# Patient Record
Sex: MALE | Race: OTHER | HISPANIC OR LATINO | Employment: OTHER | URBAN - METROPOLITAN AREA
[De-identification: names, ages, dates, MRNs, and addresses within clinical notes are randomized per-mention and may not be internally consistent; named-entity substitution may affect disease eponyms.]

---

## 2020-07-18 ENCOUNTER — HOSPITAL ENCOUNTER (INPATIENT)
Facility: HOSPITAL | Age: 75
LOS: 1 days | Discharge: HOME | DRG: 384 | End: 2020-07-20
Attending: EMERGENCY MEDICINE | Admitting: FAMILY MEDICINE
Payer: MEDICARE

## 2020-07-18 ENCOUNTER — APPOINTMENT (EMERGENCY)
Dept: RADIOLOGY | Facility: HOSPITAL | Age: 75
DRG: 384 | End: 2020-07-18
Payer: MEDICARE

## 2020-07-18 DIAGNOSIS — R10.13 DYSPEPSIA: ICD-10-CM

## 2020-07-18 DIAGNOSIS — Z87.11 HISTORY OF PEPTIC ULCER: ICD-10-CM

## 2020-07-18 DIAGNOSIS — D64.9 ANEMIA: ICD-10-CM

## 2020-07-18 DIAGNOSIS — R06.01 ORTHOPNEA: ICD-10-CM

## 2020-07-18 DIAGNOSIS — I10 ESSENTIAL HYPERTENSION: ICD-10-CM

## 2020-07-18 DIAGNOSIS — R07.9 CHEST PAIN: ICD-10-CM

## 2020-07-18 DIAGNOSIS — K27.9 PEPTIC ULCER DISEASE: ICD-10-CM

## 2020-07-18 DIAGNOSIS — R07.89 ATYPICAL CHEST PAIN: Primary | ICD-10-CM

## 2020-07-18 PROBLEM — R06.02 CHRONIC SHORTNESS OF BREATH: Status: ACTIVE | Noted: 2020-07-18

## 2020-07-18 LAB
ALBUMIN SERPL BCP-MCNC: 3.3 G/DL (ref 3.5–5)
ALP SERPL-CCNC: 115 U/L (ref 46–116)
ALT SERPL W P-5'-P-CCNC: 43 U/L (ref 12–78)
ANION GAP SERPL CALCULATED.3IONS-SCNC: 8 MMOL/L (ref 4–13)
AST SERPL W P-5'-P-CCNC: 31 U/L (ref 5–45)
BASOPHILS # BLD AUTO: 0.06 THOUSANDS/ΜL (ref 0–0.1)
BASOPHILS NFR BLD AUTO: 1 % (ref 0–1)
BILIRUB SERPL-MCNC: 0.5 MG/DL (ref 0.2–1)
BUN SERPL-MCNC: 7 MG/DL (ref 5–25)
CALCIUM SERPL-MCNC: 8.7 MG/DL (ref 8.3–10.1)
CHLORIDE SERPL-SCNC: 103 MMOL/L (ref 100–108)
CO2 SERPL-SCNC: 28 MMOL/L (ref 21–32)
CREAT SERPL-MCNC: 0.81 MG/DL (ref 0.6–1.3)
EOSINOPHIL # BLD AUTO: 0.1 THOUSAND/ΜL (ref 0–0.61)
EOSINOPHIL NFR BLD AUTO: 2 % (ref 0–6)
ERYTHROCYTE [DISTWIDTH] IN BLOOD BY AUTOMATED COUNT: 17.4 % (ref 11.6–15.1)
GFR SERPL CREATININE-BSD FRML MDRD: 86 ML/MIN/1.73SQ M
GLUCOSE SERPL-MCNC: 121 MG/DL (ref 65–140)
HCT VFR BLD AUTO: 30.2 % (ref 36.5–49.3)
HGB BLD-MCNC: 8.2 G/DL (ref 12–17)
IMM GRANULOCYTES # BLD AUTO: 0.01 THOUSAND/UL (ref 0–0.2)
IMM GRANULOCYTES NFR BLD AUTO: 0 % (ref 0–2)
LIPASE SERPL-CCNC: 206 U/L (ref 73–393)
LYMPHOCYTES # BLD AUTO: 2.45 THOUSANDS/ΜL (ref 0.6–4.47)
LYMPHOCYTES NFR BLD AUTO: 48 % (ref 14–44)
MCH RBC QN AUTO: 19.5 PG (ref 26.8–34.3)
MCHC RBC AUTO-ENTMCNC: 27.2 G/DL (ref 31.4–37.4)
MCV RBC AUTO: 72 FL (ref 82–98)
MONOCYTES # BLD AUTO: 0.84 THOUSAND/ΜL (ref 0.17–1.22)
MONOCYTES NFR BLD AUTO: 16 % (ref 4–12)
NEUTROPHILS # BLD AUTO: 1.71 THOUSANDS/ΜL (ref 1.85–7.62)
NEUTS SEG NFR BLD AUTO: 33 % (ref 43–75)
NRBC BLD AUTO-RTO: 0 /100 WBCS
PLATELET # BLD AUTO: 187 THOUSANDS/UL (ref 149–390)
PMV BLD AUTO: 9.5 FL (ref 8.9–12.7)
POTASSIUM SERPL-SCNC: 3.8 MMOL/L (ref 3.5–5.3)
PROT SERPL-MCNC: 7.8 G/DL (ref 6.4–8.2)
RBC # BLD AUTO: 4.2 MILLION/UL (ref 3.88–5.62)
SODIUM SERPL-SCNC: 139 MMOL/L (ref 136–145)
TROPONIN I SERPL-MCNC: 0.02 NG/ML
TROPONIN I SERPL-MCNC: 0.03 NG/ML
TROPONIN I SERPL-MCNC: 0.03 NG/ML
TROPONIN I SERPL-MCNC: 0.04 NG/ML
TROPONIN I SERPL-MCNC: 0.04 NG/ML
WBC # BLD AUTO: 5.17 THOUSAND/UL (ref 4.31–10.16)

## 2020-07-18 PROCEDURE — 99285 EMERGENCY DEPT VISIT HI MDM: CPT | Performed by: PHYSICIAN ASSISTANT

## 2020-07-18 PROCEDURE — 99285 EMERGENCY DEPT VISIT HI MDM: CPT

## 2020-07-18 PROCEDURE — 36415 COLL VENOUS BLD VENIPUNCTURE: CPT | Performed by: PHYSICIAN ASSISTANT

## 2020-07-18 PROCEDURE — 84484 ASSAY OF TROPONIN QUANT: CPT | Performed by: FAMILY MEDICINE

## 2020-07-18 PROCEDURE — 96374 THER/PROPH/DIAG INJ IV PUSH: CPT

## 2020-07-18 PROCEDURE — 71045 X-RAY EXAM CHEST 1 VIEW: CPT

## 2020-07-18 PROCEDURE — 1124F ACP DISCUSS-NO DSCNMKR DOCD: CPT | Performed by: PATHOLOGY

## 2020-07-18 PROCEDURE — 99220 PR INITIAL OBSERVATION CARE/DAY 70 MINUTES: CPT | Performed by: INTERNAL MEDICINE

## 2020-07-18 PROCEDURE — 85025 COMPLETE CBC W/AUTO DIFF WBC: CPT | Performed by: PHYSICIAN ASSISTANT

## 2020-07-18 PROCEDURE — 96375 TX/PRO/DX INJ NEW DRUG ADDON: CPT

## 2020-07-18 PROCEDURE — 80053 COMPREHEN METABOLIC PANEL: CPT | Performed by: PHYSICIAN ASSISTANT

## 2020-07-18 PROCEDURE — 83690 ASSAY OF LIPASE: CPT | Performed by: PHYSICIAN ASSISTANT

## 2020-07-18 PROCEDURE — 96361 HYDRATE IV INFUSION ADD-ON: CPT

## 2020-07-18 PROCEDURE — 93005 ELECTROCARDIOGRAM TRACING: CPT

## 2020-07-18 PROCEDURE — 84484 ASSAY OF TROPONIN QUANT: CPT | Performed by: PHYSICIAN ASSISTANT

## 2020-07-18 RX ORDER — METOPROLOL TARTRATE 5 MG/5ML
5 INJECTION INTRAVENOUS ONCE
Status: COMPLETED | OUTPATIENT
Start: 2020-07-18 | End: 2020-07-18

## 2020-07-18 RX ORDER — KETOROLAC TROMETHAMINE 30 MG/ML
15 INJECTION, SOLUTION INTRAMUSCULAR; INTRAVENOUS ONCE
Status: COMPLETED | OUTPATIENT
Start: 2020-07-18 | End: 2020-07-18

## 2020-07-18 RX ORDER — MAGNESIUM HYDROXIDE/ALUMINUM HYDROXICE/SIMETHICONE 120; 1200; 1200 MG/30ML; MG/30ML; MG/30ML
30 SUSPENSION ORAL EVERY 4 HOURS PRN
Status: DISCONTINUED | OUTPATIENT
Start: 2020-07-18 | End: 2020-07-21 | Stop reason: HOSPADM

## 2020-07-18 RX ORDER — HYDROCHLOROTHIAZIDE 12.5 MG/1
12.5 TABLET ORAL DAILY
COMMUNITY
End: 2020-07-20 | Stop reason: HOSPADM

## 2020-07-18 RX ORDER — LIDOCAINE HYDROCHLORIDE 20 MG/ML
15 SOLUTION OROPHARYNGEAL ONCE
Status: COMPLETED | OUTPATIENT
Start: 2020-07-18 | End: 2020-07-18

## 2020-07-18 RX ORDER — SUCRALFATE ORAL 1 G/10ML
1000 SUSPENSION ORAL ONCE
Status: COMPLETED | OUTPATIENT
Start: 2020-07-18 | End: 2020-07-18

## 2020-07-18 RX ORDER — ASPIRIN 325 MG
325 TABLET ORAL ONCE
Status: COMPLETED | OUTPATIENT
Start: 2020-07-18 | End: 2020-07-18

## 2020-07-18 RX ORDER — NITROGLYCERIN 0.4 MG/1
0.4 TABLET SUBLINGUAL
Status: DISCONTINUED | OUTPATIENT
Start: 2020-07-18 | End: 2020-07-21 | Stop reason: HOSPADM

## 2020-07-18 RX ORDER — MAGNESIUM HYDROXIDE/ALUMINUM HYDROXICE/SIMETHICONE 120; 1200; 1200 MG/30ML; MG/30ML; MG/30ML
30 SUSPENSION ORAL EVERY 4 HOURS PRN
Qty: 355 ML | Refills: 0 | Status: SHIPPED | OUTPATIENT
Start: 2020-07-18

## 2020-07-18 RX ORDER — MAGNESIUM HYDROXIDE/ALUMINUM HYDROXICE/SIMETHICONE 120; 1200; 1200 MG/30ML; MG/30ML; MG/30ML
30 SUSPENSION ORAL ONCE
Status: COMPLETED | OUTPATIENT
Start: 2020-07-18 | End: 2020-07-18

## 2020-07-18 RX ORDER — NITROGLYCERIN 0.4 MG/1
0.4 TABLET SUBLINGUAL ONCE
Status: COMPLETED | OUTPATIENT
Start: 2020-07-18 | End: 2020-07-18

## 2020-07-18 RX ADMIN — FAMOTIDINE 20 MG: 10 INJECTION INTRAVENOUS at 06:25

## 2020-07-18 RX ADMIN — NITROGLYCERIN 0.4 MG: 0.4 TABLET SUBLINGUAL at 07:56

## 2020-07-18 RX ADMIN — ALUMINUM HYDROXIDE, MAGNESIUM HYDROXIDE, AND SIMETHICONE 30 ML: 200; 200; 20 SUSPENSION ORAL at 07:56

## 2020-07-18 RX ADMIN — LIDOCAINE HYDROCHLORIDE 15 ML: 20 SOLUTION ORAL; TOPICAL at 07:56

## 2020-07-18 RX ADMIN — ALUMINUM HYDROXIDE, MAGNESIUM HYDROXIDE, AND SIMETHICONE 30 ML: 200; 200; 20 SUSPENSION ORAL at 21:00

## 2020-07-18 RX ADMIN — ASPIRIN 325 MG ORAL TABLET 325 MG: 325 PILL ORAL at 06:19

## 2020-07-18 RX ADMIN — METOPROLOL TARTRATE 5 MG: 1 INJECTION, SOLUTION INTRAVENOUS at 07:56

## 2020-07-18 RX ADMIN — KETOROLAC TROMETHAMINE 15 MG: 30 INJECTION, SOLUTION INTRAMUSCULAR at 06:21

## 2020-07-18 RX ADMIN — SODIUM CHLORIDE 1000 ML: 0.9 INJECTION, SOLUTION INTRAVENOUS at 06:10

## 2020-07-18 RX ADMIN — SUCRALFATE 1000 MG: 1 SUSPENSION ORAL at 06:20

## 2020-07-18 NOTE — ED PROVIDER NOTES
History  Chief Complaint   Patient presents with    Chest Pain     pt reports left sided chest pain that started yesterday; pt reports not being able to sleep all night; pain radiates to back     50-year-old male with no relevant past medical history who presents to the emergency department with son at bedside for complaint of chest pain beginning this morning at approximately 3a  Patient is [de-identified] Pashto-speaking, son available at bedside for [place] translation  Patient describes the pain as sharp with radiation to the back, constant, not made better or worse by anything in particular, did not try any medicine at home  Son reports he was unable to sleep all night and seemed restless  States patient initially thought the pain was gas pain  Son also admits to some sweating  Patient denies any nausea, vomiting, lightheadedness /dizziness, increased weakness or fatigue, shortness of breath, increased work of breathing, palpitations, chest heaviness or pressure sensation  He denies a history of acute MI or stent placement  States he felt this pain once before in the past when he was living in [facility] and required a blood transfusion, disease state unknown  He denies any new exercise or heavy lifting  He denies a history of PE or DVT, recent long travel by car or plane, recent surgery or immobilization, unilateral skin color changes or swelling  Denies any urinary symptoms  None       History reviewed  No pertinent past medical history  History reviewed  No pertinent surgical history  History reviewed  No pertinent family history  I have reviewed and agree with the history as documented      E-Cigarette/Vaping    E-Cigarette Use Never User      E-Cigarette/Vaping Substances     Social History     Tobacco Use    Smoking status: Never Smoker    Smokeless tobacco: Never Used   Substance Use Topics    Alcohol use: Yes     Frequency: 4 or more times a week     Drinks per session: 7 to 9  Drug use: Never       Review of Systems   Review of Systems   Constitutional: Positive for diaphoresis  Negative for chills, fatigue and fever  Respiratory: Negative for cough, chest tightness, shortness of breath and wheezing  Cardiovascular: Positive for chest pain  Negative for palpitations and leg swelling  Gastrointestinal: Negative for abdominal distention, abdominal pain, constipation, diarrhea, nausea and vomiting  Genitourinary: Negative for decreased urine volume, difficulty urinating, dysuria, flank pain, frequency, hematuria and urgency  Musculoskeletal: Positive for back pain  Negative for neck pain and neck stiffness  Skin: Negative for color change and rash  Neurological: Negative for dizziness, syncope, weakness, light-headedness, numbness and headaches  Psychiatric/Behavioral: Positive for sleep disturbance  All other systems reviewed and are negative  Physical Exam    Physical Exam   Constitutional: He is oriented to person, place, and time  He appears well-developed and well-nourished  He is cooperative  Non-toxic appearance  No distress  HENT:   Head: Normocephalic and atraumatic  Mouth/Throat: Uvula is midline, oropharynx is clear and moist and mucous membranes are normal    Eyes: Pupils are equal, round, and reactive to light  Conjunctivae and EOM are normal    Neck: Normal range of motion  Neck supple  Cardiovascular: Normal rate, regular rhythm, normal heart sounds, intact distal pulses and normal pulses  No murmur heard  Pulmonary/Chest: Effort normal and breath sounds normal  He exhibits no tenderness  Abdominal: Soft  Normal appearance and bowel sounds are normal  There is no tenderness  Musculoskeletal: Normal range of motion  Neurological: He is alert and oriented to person, place, and time  Skin: Skin is warm  Capillary refill takes less than 2 seconds  No lesion and no rash noted  No erythema     Vitals reviewed           Vital Signs  ED Triage Vitals [07/18/20 0544]   Temperature Pulse Respirations Blood Pressure SpO2   98 3 °F (36 8 °C) 101 (!) 25 (!) 182/86 99 %      Temp Source Heart Rate Source Patient Position - Orthostatic VS BP Location FiO2 (%)   Oral Monitor Lying Right arm --      Pain Score       2           Vitals:    07/18/20 0544 07/18/20 0655   BP: (!) 182/86 (!) 186/87   Pulse: 101 101   Patient Position - Orthostatic VS: Lying Lying         Visual Acuity      ED Medications  Medications   nitroglycerin (NITROSTAT) SL tablet 0 4 mg (has no administration in time range)   metoprolol (LOPRESSOR) injection 5 mg (has no administration in time range)   ketorolac (TORADOL) injection 15 mg (15 mg Intravenous Given 7/18/20 0621)   aspirin tablet 325 mg (325 mg Oral Given 7/18/20 0619)   famotidine (PEPCID) injection 20 mg (20 mg Intravenous Given 7/18/20 0625)   sucralfate (CARAFATE) oral suspension 1,000 mg (1,000 mg Oral Given 7/18/20 0620)   sodium chloride 0 9 % bolus 1,000 mL (1,000 mL Intravenous New Bag 7/18/20 0610)       Diagnostic Studies  Results Reviewed     Procedure Component Value Units Date/Time    Troponin I [[id number]]  (Normal) Collected:  07/18/20 0606    Lab Status:  Final result Specimen:  Blood from Arm, Right Updated:  07/18/20 0643     Troponin I 0 02 ng/mL     Comprehensive metabolic panel [[id number]]  (Abnormal) Collected:  07/18/20 0606    Lab Status:  Final result Specimen:  Blood from Arm, Right Updated:  07/18/20 0641     Sodium 139 mmol/L      Potassium 3 8 mmol/L      Chloride 103 mmol/L      CO2 28 mmol/L      ANION GAP 8 mmol/L      BUN 7 mg/dL      Creatinine 0 81 mg/dL      Glucose 121 mg/dL      Calcium 8 7 mg/dL      AST 31 U/L      ALT 43 U/L      Alkaline Phosphatase 115 U/L      Total Protein 7 8 g/dL      Albumin 3 3 g/dL      Total Bilirubin 0 50 mg/dL      eGFR 86 ml/min/1 73sq m     Narrative:       [place] guidelines for Chronic Kidney Disease (CKD):     Stage 1 with normal or high GFR (GFR > 90 mL/min/1 73 square meters)    Stage 2 Mild CKD (GFR = 60-89 mL/min/1 73 square meters)    Stage 3A Moderate CKD (GFR = 45-59 mL/min/1 73 square meters)    Stage 3B Moderate CKD (GFR = 30-44 mL/min/1 73 square meters)    Stage 4 Severe CKD (GFR = 15-29 mL/min/1 73 square meters)    Stage 5 End Stage CKD (GFR <15 mL/min/1 73 square meters)  Note: GFR calculation is accurate only with a steady state creatinine    Lipase [[id number]]  (Normal) Collected:  07/18/20 0606    Lab Status:  Final result Specimen:  Blood from Arm, Right Updated:  07/18/20 0641     Lipase 206 u/L     CBC and differential [[id number]]  (Abnormal) Collected:  07/18/20 0606    Lab Status:  Final result Specimen:  Blood from Arm, Right Updated:  07/18/20 0615     WBC 5 17 Thousand/uL      RBC 4 20 Million/uL      Hemoglobin 8 2 g/dL      Hematocrit 30 2 %      MCV 72 fL      MCH 19 5 pg      MCHC 27 2 g/dL      RDW 17 4 %      MPV 9 5 fL      Platelets 668 Thousands/uL      nRBC 0 /100 WBCs      Neutrophils Relative 33 %      Immat GRANS % 0 %      Lymphocytes Relative 48 %      Monocytes Relative 16 %      Eosinophils Relative 2 %      Basophils Relative 1 %      Neutrophils Absolute 1 71 Thousands/µL      Immature Grans Absolute 0 01 Thousand/uL      Lymphocytes Absolute 2 45 Thousands/µL      Monocytes Absolute 0 84 Thousand/µL      Eosinophils Absolute 0 10 Thousand/µL      Basophils Absolute 0 06 Thousands/µL                  XR chest 1 view portable    (Results Pending)              Procedures  Procedures         ED Course  ED Course as of Jul 18 0726   Sat Jul 18, 2020   0656 BP persistently elevated  Has nonspecific changes on EKG  Hgb decreased, looks like GRETEL, unsure of hgb value previously or circumstances of transfusion  Reports chest pain is somewhat improved but still coming and going  Will seek admission with [facility]  US AUDIT      Most Recent Value   Initial Alcohol Screen: US AUDIT-C    1   How often do you have a drink containing alcohol? 6 Filed at: 07/18/2020 0539   2  How many drinks containing alcohol do you have on a typical day you are drinking? 5 Filed at: 07/18/2020 0539   3a  Male UNDER 65: How often do you have five or more drinks on one occasion? 0 Filed at: 07/18/2020 0539   3b  FEMALE Any Age, or MALE 65+: How often do you have 4 or more drinks on one occassion? 0 Filed at: 07/18/2020 0539   Audit-C Score  (!) 11 Filed at: 07/18/2020 5752   Full Alcohol Screen: US AUDIT   4  How often during the last year have you found that you were not able to stop drinking once you had started? 0 Filed at: 07/18/2020 0539   5  How often during past year have you failed to do what was normally expected of you because of drinking? 0 Filed at: 07/18/2020 0539   6  How often in past year have you needed a first drink in the morning to get yourself going after a heavy drinking session? 0 Filed at: 07/18/2020 0539   7  How often in past year have you had feeling of guilt or remorse after drinking? 0 Filed at: 07/18/2020 0539   8  How often in past year have you been unable to remember what happened night before because you had been drinking? 0 Filed at: 07/18/2020 0539   9  Have you or someone else been injured as a result of your drinking? 0 Filed at: 07/18/2020 0539   10   Has a relative, friend, doctor or other health worker been concerned about your drinking and suggested you cut down?   0 Filed at: 07/18/2020 0539   AUDIT Total Score  11 Filed at: 07/18/2020 0539            HEART Risk Score      Most Recent Value   Heart Score Risk Calculator   History  1 Filed at: 07/18/2020 0646   ECG  1 Filed at: 07/18/2020 7755   Age  2 Filed at: 07/18/2020 0646   Risk Factors  0 Filed at: 07/18/2020 0646   Troponin  0 Filed at: 07/18/2020 0646   HEART Score  4 Filed at: 07/18/2020 8228        Identification of Seniors at Risk      Most Recent Value   (ISAR) Identification of Seniors at Risk   Before the illness or injury that brought you to the Emergency, did you need someone to help you on a regular basis? 0 Filed at: 07/18/2020 0542   In the last 24 hours, have you needed more help than usual?  0 Filed at: 07/18/2020 8467   Have you been hospitalized for one or more nights during the past 6 months? 0 Filed at: 07/18/2020 0542   In general, do you see well?  0 Filed at: 07/18/2020 0542   In general, do you have serious problems with your memory? 0 Filed at: 07/18/2020 0542   Do you take more than three different medications every day?  0 Filed at: 07/18/2020 0542   ISAR Score  0 Filed at: 07/18/2020 0542          BARBIE/DAST-10      Most Recent Value   How many times in the past year have you    Used an illegal drug or used a prescription medication for non-medical reasons? Never Filed at: 07/18/2020 0539                                MDM  Number of Diagnoses or Management Options  Atypical chest pain:   Diagnosis management comments: Consider the following differential diagnoses:   Unstable angina, ACS, GERD, gastritis, peptic ulcer disease, musculoskeletal chest wall pain, constipation, nephrolithiasis, acute pancreatitis, AAA      Will pursue cardiac workup  Patient looks well, in no distress   Questionable medical history, as no records available and son does not believe he has any medical conditions, states he has a medicine back home in [place] but unsure what it is    Low suspicion for AAA, as no tenderness on exam, patient looks comfortably and not distressed, no severe pain, no severely elevated BP, abdominal exam showing no tenderness or aortic abnormality      Will admin aspirin, Toradol, and sucralfate  Will start fluids and reassess, consider nitro if no improvement, consider BP med if no improvement in BP and tachy         Amount and/or Complexity of Data Reviewed  Clinical lab tests: reviewed and ordered  Tests in the radiology section of CPT®: ordered and reviewed  Tests in the medicine section of CPT®: ordered and reviewed  Discussion of test results with the performing providers: yes  Decide to obtain previous medical records or to obtain history from someone other than the patient: yes  Obtain history from someone other than the patient: yes  Review and summarize past medical records: yes  Discuss the patient with other providers: yes  Independent visualization of images, tracings, or specimens: yes    Risk of Complications, Morbidity, and/or Mortality  General comments: See ED course note for dispo and plan  I reviewed and discussed all lab and imaging findings with the patient at bedside  I answered any and all questions the patient had regarding emergency department course of evaluation and treatment  The patient verbalized understanding of and agreement with plan  Patient Progress  Patient progress: stable        Disposition  Final diagnoses:   Atypical chest pain     Time reflects when diagnosis was documented in both MDM as applicable and the Disposition within this note     Time User Action Codes Description Comment    7/18/2020  7:20 AM Dearl Sara Mabry [R07 [id number]] Atypical chest pain       ED Disposition     ED Disposition Condition Date/Time Comment    Admit Stable Sat Jul 18, 2020  7:20 AM Case was discussed with Dr Kishore Dick and the patient's admission status was agreed to be Admission Status: observation status to the service of Dr Kishore Dick   Follow-up Information    None         Patient's Medications    No medications on file     No discharge procedures on file      PDMP Review     None          ED Provider  Electronically Signed by           Rome Romero PA-C  07/18/20 0531

## 2020-07-18 NOTE — H&P
H&P- Mary Lou Aldana 1945, 67 y o  male MRN: [id number]    Unit/Bed#: ED 12 Encounter: [id number]    Primary Care Provider: No primary care provider on file  Date and time admitted to hospital: 7/18/2020  5:32 AM        * Chest pain  Assessment & Plan  Has been having chest pain since 2-3 weeks off and on, non exertional, non pleuritic  No hypoxia associated, no diaphoresis  Non specific EKG changes  Will obtain a repeat EKG with next troponin  Trend troponins  If trends up then will involve cardiology  Currently no chest pain  Aortic etiologies like aneurysm or dissection low on differentials  He complains of gaseous abd distention which could be contributing to his chest pain but will rule ACS out with troponin first  Give mylanta and NTG  Check TSH        Chronic shortness of breath  Assessment & Plan  No acute exacerbation  Continue to monitor  Does not take any breathing treatments    Essential hypertension  Assessment & Plan  BP >227 systolic on admission  Now in 160s, does not take any HTN meds at home  Will add amlodipine to the regimen if BP continues to be elevated    VTE Prophylaxis: Low risk  / sequential compression device   Code Status:  Full code  POLST: There is no POLST form on file for this patient (pre-hospital)  Discussion with family:  Son at bedside and the case discussed with him  Anticipated Length of Stay:  Patient will be admitted on an Observation basis with an anticipated length of stay of  less than 2 midnights  Justification for Hospital Stay:  Chest pain left-sided, ACS needs to be ruled out    Total Time for Visit, including Counseling / Coordination of Care: 45 minutes  Greater than 50% of this total time spent on direct patient counseling and coordination of care  Chief Complaint:   Left-sided chest pain    History of Present Illness:    Mary Lou Aldana is a 67 y o  male who presents with no significant past medical history presents with left-sided chest pain    Chest pain has been intermittently going on since 2-3 weeks  He came into the ER today because he was unable to sleep because of the chest pain  Location is left-sided radiating into the back, no diaphoresis, no shortness of breath and though he is chronically short of breath but there is no acute worsening  5/10, worsens with laying down and improves with sitting up but this trend is not present at all times  He says that he quit smoking about 30 years ago  The chest pain is described as grabbing and gaslike pain  In the ED he had an EKG done which shows nonspecific ST T wave changes and will need repeat EKG  First set of troponins is negative  EKG does not show any focal consolidations  Review of Systems:    Review of Systems   Constitutional: Negative for activity change, appetite change, chills, diaphoresis, fatigue and fever  HENT: Negative for congestion, ear discharge, ear pain, facial swelling, hearing loss, nosebleeds and postnasal drip  Respiratory: Positive for shortness of breath  Negative for apnea, cough, choking, chest tightness and wheezing  Cardiovascular: Positive for chest pain  Negative for palpitations and leg swelling  Gastrointestinal: Positive for abdominal distention  Negative for abdominal pain, anal bleeding, blood in stool and constipation  Endocrine: Negative for cold intolerance, heat intolerance, polydipsia and polyphagia  Genitourinary: Negative for dysuria, flank pain, frequency and hematuria  Musculoskeletal: Negative for arthralgias, back pain, gait problem and joint swelling  Neurological: Negative for dizziness, seizures, light-headedness, numbness and headaches  Psychiatric/Behavioral: Negative for agitation, behavioral problems, confusion and decreased concentration  Past Medical and Surgical History:     As per the son at bedside patient has received blood transfusion [facility] the past [facility] for unknown reason    History reviewed   No pertinent past medical history  History reviewed  No pertinent surgical history  Meds/Allergies:    Prior to Admission medications    Not on File     I have reviewed home medications with patient personally  Allergies: No Known Allergies    Social History:     Marital Status: /Civil Union     Social History     Substance and Sexual Activity   Alcohol Use Yes    Frequency: 4 or more times a week    Drinks per session: 7 to 9     Social History     Tobacco Use   Smoking Status Never Smoker   Smokeless Tobacco Never Used     Social History     Substance and Sexual Activity   Drug Use Never       Family History:    Reviewed with the patient-no significant family history endorsed    Physical Exam:     Vitals:   Blood Pressure: 165/71 (07/18/20 0800)  Pulse: 92 (07/18/20 0800)  Temperature: 98 3 °F (36 8 °C) (07/18/20 0544)  Temp Source: Oral (07/18/20 0544)  Respirations: (!) 35 (07/18/20 0800)  Height: 5' 10" (177 8 cm) (07/18/20 0544)  Weight - Scale: 100 kg (220 lb 14 4 oz) (07/18/20 0544)  SpO2: 97 % (07/18/20 0800)    Physical Exam    General- Awake, alert and oriented x 3, looks comfortable  HEENT- Normocephalic, atraumatic, oral mucosa- moist  Neck- Supple, No carotid bruit, no JVD  CVS- Normal S1/ S2, Regular rate and rhythm, No murmur, No edema  Respiratory system- B/L clear breath sounds, no wheezing  Abdomen- Soft, Non distended, no tenderness, Bowel sound- present 4 quads  Genitourinary- No suprapubic tenderness, No CVA tenderness  Skin- No new bruise or rash  Musculoskeletal- No gross deformity  Psych- No acute psychosis  CNS- CN II- XII grossly intact, No acute focal neurologic deficit noted    Additional Data:     Lab Results: I have personally reviewed pertinent reports        Results from last 7 days   Lab Units 07/18/20  0606   WBC Thousand/uL 5 17   HEMOGLOBIN g/dL 8 2*   HEMATOCRIT % 30 2*   PLATELETS Thousands/uL 187   NEUTROS PCT % 33*   LYMPHS PCT % 48*   MONOS PCT % 16*   EOS PCT % 2 Results from last 7 days   Lab Units 07/18/20  0606   SODIUM mmol/L 139   POTASSIUM mmol/L 3 8   CHLORIDE mmol/L 103   CO2 mmol/L 28   BUN mg/dL 7   CREATININE mg/dL 0 81   ANION GAP mmol/L 8   CALCIUM mg/dL 8 7   ALBUMIN g/dL 3 3*   TOTAL BILIRUBIN mg/dL 0 50   ALK PHOS U/L 115   ALT U/L 43   AST U/L 31   GLUCOSE RANDOM mg/dL 121                       Imaging: I have personally reviewed pertinent reports  XR chest 1 view portable    (Results Pending)       EKG, Pathology, and Other Studies Reviewed on Admission:   · EKG:  Nonspecific ST T wave changes reported    Allscripts / [facility] Records Reviewed: Yes     ** Please Note: This note has been constructed using a voice recognition system   **

## 2020-07-18 NOTE — ASSESSMENT & PLAN NOTE
BP >890 systolic on admission  Now in 160s, does not take any HTN meds at home  Will add amlodipine to the regimen if BP continues to be elevated

## 2020-07-18 NOTE — DISCHARGE INSTR - AVS FIRST PAGE
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

## 2020-07-18 NOTE — UTILIZATION REVIEW
Initial Clinical Review    Admission: Date/Time/Statement: Admission Orders (From admission, onward)     Ordered        07/18/20 0720  Place in Observation  Once                   Orders Placed This Encounter   Procedures    Place in Observation     Standing Status:   Standing     Number of Occurrences:   1     Order Specific Question:   Admitting Physician     Answer:   Araceli Garnett [[id number]]     Order Specific Question:   Level of Care     Answer:   Med Surg [16]     ED Arrival Information     Expected Arrival Acuity Means of Arrival Escorted By Service Admission Type    - 7/18/2020 05:28 Urgent Walk-In Family Member General Medicine Urgent    Arrival Complaint    Chest Pain        Chief Complaint   Patient presents with    Chest Pain     pt reports left sided chest pain that started yesterday; pt reports not being able to sleep all night; pain radiates to back     Assessment/Plan: Chest pain  Assessment & Plan  Has been having chest pain since 2-3 weeks off and on, non exertional, non pleuritic  No hypoxia associated, no diaphoresis  Non specific EKG changes  Will obtain a repeat EKG with next troponin  Trend troponins   If trends up then will involve cardiology  Currently no chest pain  Aortic etiologies like aneurysm or dissection low on differentials  He complains of gaseous abd distention which could be contributing to his chest pain but will rule ACS out with troponin first  Give mylanta and NTG  Check TSH      Chronic shortness of breath  Assessment & Plan  No acute exacerbation  Continue to monitor  Does not take any breathing treatments     Essential hypertension  Assessment & Plan  BP >003 systolic on admission  Now in 160s, does not take any HTN meds at home  Will add amlodipine to the regimen if BP continues to be elevated     VTE Prophylaxis: Low risk  / sequential compression device   Code Status:  Full code  POLST: There is no POLST form on file for this patient (pre-hospital)  Discussion with family:  Son at bedside and the case discussed with him      Anticipated Length of Stay:  Patient will be admitted on an Observation basis with an anticipated length of stay of  less than 2 midnights  Justification for Hospital Stay:  Chest pain left-sided, ACS needs to be ruled out     Dana Arellano is a 67 y o  male who presents with no significant past medical history presents with left-sided chest pain  Chest pain has been intermittently going on since 2-3 weeks  He came into the ER today because he was unable to sleep because of the chest pain  Location is left-sided radiating into the back, no diaphoresis, no shortness of breath and though he is chronically short of breath but there is no acute worsening  5/10, worsens with laying down and improves with sitting up but this trend is not present at all times  He says that he quit smoking about 30 years ago  The chest pain is described as grabbing and gaslike pain  In the ED he had an EKG done which shows nonspecific ST T wave changes and will need repeat EKG  First set of troponins is negative    EKG does not show any focal consolidations    ED Triage Vitals [07/18/20 0544]   Temperature Pulse Respirations Blood Pressure SpO2   98 3 °F (36 8 °C) 101 (!) 25 (!) 182/86 99 %      Temp Source Heart Rate Source Patient Position - Orthostatic VS BP Location FiO2 (%)   Oral Monitor Lying Right arm --      Pain Score       2        Wt Readings from Last 1 Encounters:   07/18/20 96 1 kg (211 lb 13 8 oz)     Additional Vital Signs:   07/18/20 09:15:21  98 °F (36 7 °C)  87  17  142/77  99  99 %  --  --   07/18/20 0830  --  86  17  141/70  97  97 %  None (Room air)  --   07/18/20 0800  --  92  18  165/71  102  97 %  None (Room air)  --   07/18/20 0655  --  101  20  186/87Abnormal   --  99 %  None (Room air)  Lying   07/18/20 0544  98 3 °F (36 8 °C)  101  25Abnormal   182/86Abnormal   --  99 %  None (Room air)  Lying         Pertinent Labs/Diagnostic Test Results: EKG  Non  Specific  changed      Results from last 7 days   Lab Units 07/18/20  0606   WBC Thousand/uL 5 17   HEMOGLOBIN g/dL 8 2*   HEMATOCRIT % 30 2*   PLATELETS Thousands/uL 187   NEUTROS ABS Thousands/µL 1 71*         Results from last 7 days   Lab Units 07/18/20  0606   SODIUM mmol/L 139   POTASSIUM mmol/L 3 8   CHLORIDE mmol/L 103   CO2 mmol/L 28   ANION GAP mmol/L 8   BUN mg/dL 7   CREATININE mg/dL 0 81   EGFR ml/min/1 73sq m 86   CALCIUM mg/dL 8 7     Results from last 7 days   Lab Units 07/18/20  0606   AST U/L 31   ALT U/L 43   ALK PHOS U/L 115   TOTAL PROTEIN g/dL 7 8   ALBUMIN g/dL 3 3*   TOTAL BILIRUBIN mg/dL 0 50         Results from last 7 days   Lab Units 07/18/20  0606   GLUCOSE RANDOM mg/dL 121           Results from last 7 days   Lab Units 07/18/20  0606   TROPONIN I ng/mL 0 02         Results from last 7 days   Lab Units 07/18/20  0606   LIPASE u/L 206         ED Treatment:   Medication Administration from 07/18/2020 0528 to 07/18/2020 9507       Date/Time Order Dose Route Action Action by Comments     07/18/2020 0621 ketorolac (TORADOL) injection 15 mg 15 mg Intravenous Given Viridiana Brewster RN      07/18/2020 9276 aspirin tablet 325 mg 325 mg Oral Given Viridiana Brewster RN      07/18/2020 0625 famotidine (PEPCID) injection 20 mg 20 mg Intravenous Given Viridiana Brewster RN      07/18/2020 3167 sucralfate (CARAFATE) oral suspension 1,000 mg 1,000 mg Oral Given Viridiana Brewster RN      07/18/2020 0710 sodium chloride 0 9 % bolus 1,000 mL 0 mL Intravenous Stopped Alissa Beltre RN      07/18/2020 0610 sodium chloride 0 9 % bolus 1,000 mL 1,000 mL Intravenous New Bag Viridiana Brewster RN      07/18/2020 0756 nitroglycerin (NITROSTAT) SL tablet 0 4 mg 0 4 mg Sublingual Given Alissa Beltre RN      07/18/2020 0756 metoprolol (LOPRESSOR) injection 5 mg 5 mg Intravenous Given Alissa Beltre RN      07/18/2020 0756 aluminum-magnesium hydroxide-simethicone (MYLANTA) 200-200-20 mg/5 mL oral suspension 30 mL 30 mL Oral Given Belkis Cook RN      07/18/2020 2982 Lidocaine Viscous HCl (XYLOCAINE) 2 % mucosal solution 15 mL 15 mL Swish & Spit Given Belkis Cook RN         History reviewed  No pertinent past medical history  Present on Admission:   Essential hypertension      Admitting Diagnosis: Chest pain [R07 9]  Atypical chest pain [R07 89]  Age/Sex: 67 y o  male  Admission Orders:  Scheduled Medications:     Continuous IV Infusions:     PRN Meds:    aluminum-magnesium hydroxide-simethicone 30 mL Oral Q4H PRN   nitroglycerin 0 4 mg Sublingual Q5 Min PRN       None    Network Utilization Review Department  [email or web address]  ATTENTION: Please call with any questions or concerns to [phone] and carefully listen to the prompts so that you are directed to the right person  All voicemails are confidential   [facility] all requests for admission clinical reviews, approved or denied determinations and any other requests to dedicated fax number below belonging to the campus where the patient is receiving treatment   List of dedicated fax numbers for the Facilities:  [address] DENIALS (Administrative/Medical Necessity) [phone]   [address] (Maternity/NICU/Pediatrics) [phone]   Kym Fines [phone]   [facility] [phone]   Tiny Palestinian [phone]   Christie Marquez [phone]   12[address] [facility] [address] [facility] [phone]   Edwardoshelli Lugosherry [phone]   [address] [facility] [address]  [address] [facility] [address] [facility] [phone]

## 2020-07-18 NOTE — ED NOTES
1  CC- chest pain    2  Orientation status- CLEMENTE x 4, Australian speaking    3  Abnormal labs/ focused assessment/ vitals- WDL    4  Medication/ drips- N/A    5  Last time narcotics given/ pain medications- N/A    6  IV lines/drains/ etc- 20G RAC    7  Isolation status- N/A    8  Skin- WDL    9  Ambulation status- Self    10   Trauma - N/A    11  ED phone number- 299 Phong Lemon RN  07/18/20 1283

## 2020-07-18 NOTE — ASSESSMENT & PLAN NOTE
Has been having chest pain since 2-3 weeks off and on, non exertional, non pleuritic  No hypoxia associated, no diaphoresis  Non specific EKG changes  Will obtain a repeat EKG with next troponin  Trend troponins   If trends up then will involve cardiology  Currently no chest pain  Aortic etiologies like aneurysm or dissection low on differentials  He complains of gaseous abd distention which could be contributing to his chest pain but will rule ACS out with troponin first  Give mylanta and NTG  Check TSH

## 2020-07-18 NOTE — QUICK NOTE
Troponins have been normal  Chest pain had resolved after admission but while I was discharging the patient patient had some midsternal chest pain  Now he also complains of orthopnea  EKG was done and it does not show any acute ST T wave changes but there is some T wave abnormality noted in the lateral leads    Plan:  Obtain echo  Consult cardiology

## 2020-07-18 NOTE — ED NOTES
Pt asked to use the restroom on the way to the room   Pt returned to room at [address] [place] [address], [address] [place] [address]  07/18/20 1435

## 2020-07-19 ENCOUNTER — APPOINTMENT (OUTPATIENT)
Dept: NON INVASIVE DIAGNOSTICS | Facility: HOSPITAL | Age: 75
DRG: 384 | End: 2020-07-19
Payer: MEDICARE

## 2020-07-19 PROBLEM — D64.9 ANEMIA: Status: ACTIVE | Noted: 2020-07-19

## 2020-07-19 PROBLEM — K27.9 PEPTIC ULCER DISEASE: Status: ACTIVE | Noted: 2020-07-19

## 2020-07-19 LAB
ANION GAP SERPL CALCULATED.3IONS-SCNC: 8 MMOL/L (ref 4–13)
ATRIAL RATE: 106 BPM
ATRIAL RATE: 95 BPM
BUN SERPL-MCNC: 7 MG/DL (ref 5–25)
CALCIUM SERPL-MCNC: 8.1 MG/DL (ref 8.3–10.1)
CHLORIDE SERPL-SCNC: 106 MMOL/L (ref 100–108)
CO2 SERPL-SCNC: 26 MMOL/L (ref 21–32)
CREAT SERPL-MCNC: 0.8 MG/DL (ref 0.6–1.3)
ERYTHROCYTE [DISTWIDTH] IN BLOOD BY AUTOMATED COUNT: 17.2 % (ref 11.6–15.1)
FERRITIN SERPL-MCNC: 6 NG/ML (ref 8–388)
GFR SERPL CREATININE-BSD FRML MDRD: 87 ML/MIN/1.73SQ M
GLUCOSE SERPL-MCNC: 115 MG/DL (ref 65–140)
HCT VFR BLD AUTO: 28.8 % (ref 36.5–49.3)
HCT VFR BLD AUTO: 29.2 % (ref 36.5–49.3)
HCT VFR BLD AUTO: 29.6 % (ref 36.5–49.3)
HGB BLD-MCNC: 7.6 G/DL (ref 12–17)
HGB BLD-MCNC: 7.7 G/DL (ref 12–17)
HGB BLD-MCNC: 7.8 G/DL (ref 12–17)
IRON SATN MFR SERPL: 4 %
IRON SERPL-MCNC: 18 UG/DL (ref 65–175)
MAGNESIUM SERPL-MCNC: 2.3 MG/DL (ref 1.6–2.6)
MCH RBC QN AUTO: 19.5 PG (ref 26.8–34.3)
MCHC RBC AUTO-ENTMCNC: 26.7 G/DL (ref 31.4–37.4)
MCV RBC AUTO: 73 FL (ref 82–98)
NT-PROBNP SERPL-MCNC: 28 PG/ML
P AXIS: 40 DEGREES
P AXIS: 5 DEGREES
PLATELET # BLD AUTO: 178 THOUSANDS/UL (ref 149–390)
PMV BLD AUTO: 10.1 FL (ref 8.9–12.7)
POTASSIUM SERPL-SCNC: 3.8 MMOL/L (ref 3.5–5.3)
PR INTERVAL: 132 MS
PR INTERVAL: 136 MS
QRS AXIS: -38 DEGREES
QRS AXIS: -48 DEGREES
QRSD INTERVAL: 102 MS
QRSD INTERVAL: 98 MS
QT INTERVAL: 356 MS
QT INTERVAL: 392 MS
QTC INTERVAL: 472 MS
QTC INTERVAL: 492 MS
RBC # BLD AUTO: 3.95 MILLION/UL (ref 3.88–5.62)
SARS-COV-2 RNA RESP QL NAA+PROBE: NEGATIVE
SODIUM SERPL-SCNC: 140 MMOL/L (ref 136–145)
T WAVE AXIS: 100 DEGREES
T WAVE AXIS: 116 DEGREES
TIBC SERPL-MCNC: 495 UG/DL (ref 250–450)
VENTRICULAR RATE: 106 BPM
VENTRICULAR RATE: 95 BPM
WBC # BLD AUTO: 4.7 THOUSAND/UL (ref 4.31–10.16)

## 2020-07-19 PROCEDURE — 93306 TTE W/DOPPLER COMPLETE: CPT

## 2020-07-19 PROCEDURE — 99232 SBSQ HOSP IP/OBS MODERATE 35: CPT | Performed by: FAMILY MEDICINE

## 2020-07-19 PROCEDURE — 83735 ASSAY OF MAGNESIUM: CPT | Performed by: FAMILY MEDICINE

## 2020-07-19 PROCEDURE — 82728 ASSAY OF FERRITIN: CPT | Performed by: FAMILY MEDICINE

## 2020-07-19 PROCEDURE — 87635 SARS-COV-2 COVID-19 AMP PRB: CPT | Performed by: INTERNAL MEDICINE

## 2020-07-19 PROCEDURE — 83880 ASSAY OF NATRIURETIC PEPTIDE: CPT | Performed by: FAMILY MEDICINE

## 2020-07-19 PROCEDURE — 99214 OFFICE O/P EST MOD 30 MIN: CPT | Performed by: INTERNAL MEDICINE

## 2020-07-19 PROCEDURE — 85018 HEMOGLOBIN: CPT | Performed by: FAMILY MEDICINE

## 2020-07-19 PROCEDURE — 93010 ELECTROCARDIOGRAM REPORT: CPT | Performed by: INTERNAL MEDICINE

## 2020-07-19 PROCEDURE — 83540 ASSAY OF IRON: CPT | Performed by: FAMILY MEDICINE

## 2020-07-19 PROCEDURE — 80048 BASIC METABOLIC PNL TOTAL CA: CPT | Performed by: FAMILY MEDICINE

## 2020-07-19 PROCEDURE — 85014 HEMATOCRIT: CPT | Performed by: FAMILY MEDICINE

## 2020-07-19 PROCEDURE — 85027 COMPLETE CBC AUTOMATED: CPT | Performed by: FAMILY MEDICINE

## 2020-07-19 PROCEDURE — 99222 1ST HOSP IP/OBS MODERATE 55: CPT | Performed by: INTERNAL MEDICINE

## 2020-07-19 PROCEDURE — 93306 TTE W/DOPPLER COMPLETE: CPT | Performed by: INTERNAL MEDICINE

## 2020-07-19 PROCEDURE — 83550 IRON BINDING TEST: CPT | Performed by: FAMILY MEDICINE

## 2020-07-19 RX ORDER — ASPIRIN 81 MG/1
81 TABLET, CHEWABLE ORAL DAILY
Status: DISCONTINUED | OUTPATIENT
Start: 2020-07-19 | End: 2020-07-19

## 2020-07-19 RX ORDER — PANTOPRAZOLE SODIUM 40 MG/1
40 TABLET, DELAYED RELEASE ORAL
Status: DISCONTINUED | OUTPATIENT
Start: 2020-07-19 | End: 2020-07-21 | Stop reason: HOSPADM

## 2020-07-19 RX ADMIN — FAMOTIDINE 20 MG: 10 INJECTION INTRAVENOUS at 04:42

## 2020-07-19 RX ADMIN — METOPROLOL TARTRATE 12.5 MG: 25 TABLET ORAL at 12:43

## 2020-07-19 RX ADMIN — METOPROLOL TARTRATE 12.5 MG: 25 TABLET ORAL at 21:34

## 2020-07-19 RX ADMIN — PANTOPRAZOLE SODIUM 40 MG: 40 TABLET, DELAYED RELEASE ORAL at 09:46

## 2020-07-19 NOTE — PROGRESS NOTES
Progress Note - Perfecto Woods 1945, 67 y o  male MRN: [id number]    Unit/Bed#: -Elisa Encounter: [id number]    Primary Care Provider: No primary care provider on file  Date and time admitted to hospital: 7/18/2020  5:32 AM        * Chest pain  Assessment & Plan  Cardiac vs GI  See note under anemia for h/o GI issues    Has been having chest pain since 2-3 weeks off and on, non exertional, non pleuritic  No hypoxia associated, no diaphoresis  Non specific EKG changes  Will obtain a repeat EKG with next troponin  Trend troponins  If trends up then will involve cardiology  Currently no chest pain  Aortic etiologies like aneurysm or dissection low on differentials  He complains of gaseous abd distention which could be contributing to his chest pain but will rule ACS out with troponin first  Give mylanta and NTG  TSH pending        Anemia  Assessment & Plan  Hb down to 7 7  H/o erosive duodenitis and gastric ulcer for which he received a unit of PRBC in [facility] about a year ago  This chest pain could be from Peptic ulcer disease for which I have started him on protonix  Check FOBT today  Hold off on ASA and other antiplatelets  Cardiology to eval today      Chronic shortness of breath  Assessment & Plan  Echo today  BNP normal  No acute exacerbation  Continue to monitor  Does not take any breathing treatments    Essential hypertension  Assessment & Plan  BP >440 systolic on admission  Now in 160s, does not take any HTN meds at home  Take hctz at home, Will initiate            Subjective/Objective     Subjective:   Seen and examined at bedside  Noted Hb drop to 7 7  CP off and on  Dyspepsia associated  Started on protonix  Check fobt   Cardiac workup initiated     Objective:  Vitals: Blood pressure 140/68, pulse 87, temperature 98 7 °F (37 1 °C), resp  rate 16, height 5' 10" (1 778 m), weight 96 1 kg (211 lb 13 8 oz), SpO2 96 %  ,Body mass index is 30 4 kg/m²        Intake/Output Summary (Last 24 hours) at 7/19/2020 1043  Last data filed at 7/19/2020 0851  Gross per 24 hour   Intake 360 ml   Output --   Net 360 ml       Invasive Devices     Peripheral Intravenous Line            Peripheral IV 07/18/20 Right Antecubital 1 day                Physical Exam: General- Awake, alert and oriented x 3, looks comfortable  HEENT- Normocephalic, atraumatic, oral mucosa- moist  Neck- Supple, No carotid bruit, no JVD  CVS- Normal S1/ S2, Regular rate and rhythm, No murmur, No edema  Respiratory system- B/L clear breath sounds, no wheezing  Abdomen- Soft, Non distended, no tenderness, Bowel sound- present 4 quads  Genitourinary- No suprapubic tenderness, No CVA tenderness  Skin- No new bruise or rash  Musculoskeletal- No gross deformity  Psych- No acute psychosis  CNS- CN II- XII grossly intact, No acute focal neurologic deficit noted      Lab, Imaging and other studies: I have personally reviewed pertinent reports      VTE Pharmacologic Prophylaxis: Sequential compression device (Venodyne)   VTE Mechanical Prophylaxis: sequential compression device

## 2020-07-19 NOTE — CONSULTS
Consultation - Cardiology Team One  Esther Mclaughlin 67 y o  male MRN: [id number]  Unit/Bed#: -01 Encounter: [id number]    Inpatient consult to Cardiology  Consult performed by: Daryle Risser, CRNP  Consult ordered by: Judy Ruiz MD          Physician Requesting Consult: Judy Ruiz MD  Reason for Consult / Principal Problem: chest pain     Assessment/Plan:    1  Chest pain  -likely GI in etiology  -troponin negative  -TTE ordered and pending, if echo normal no further cardiac workup and he can follow up as an outpatient, if no significant GI issues found will proceed with further workup   -hold off on aspirin until acute GI bleed ruled out  -start lopressor 12 5 mg daily   -check lipid panel    2  History of gastric ulcer   -diagnosis/and Brittney  -continue pantoprazole  -patient advised to decrease alcohol intake and avoid spicy or acidic foods    3  ETOH use  -cessation recommended     4  Anemia  -hemoglobin today 7 7  -recommend gastroenterology evaluation given history of gastric ulcer     HPI: 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 who has a history of tobacco use, ETOH abuse and gastric ulcer who complained of squeezing epigastric pain at rest, that is better when sitting up, not associated with SOB  He does experience shortness of breath when walking with weakness  He states that his epigastric symptoms are similar to the symptoms he experienced  when he was diagnosed with gastric ulcer last year  Troponins negative x5, chest x-ray negative, patient was noted to have significant anemia with a with a hemoglobin of 7 7        REVIEW OF SYSTEMS:  Constitutional:  Denies fever or chills   Eyes:  Denies change in visual acuity   HENT:  Denies nasal congestion or sore throat   Respiratory:  Denies cough, +shortness of breath   Cardiovascular:  Denies chest pain or edema   GI:  + epigastric pain, nausea, vomiting, bloody stools or diarrhea   :  Denies dysuria, frequency, difficulty in micturition and nocturia  Musculoskeletal:  Denies back pain or joint pain   Neurologic:  Denies headache, focal weakness or sensory changes   Endocrine:  Denies polyuria or polydipsia   Lymphatic:  Denies swollen glands   Psychiatric:  Denies depression or anxiety     Historical Information   History reviewed  No pertinent past medical history  History reviewed  No pertinent surgical history  Social History     Substance and Sexual Activity   Alcohol Use Yes    Frequency: 4 or more times a week    Drinks per session: 7 to 9     Social History     Substance and Sexual Activity   Drug Use Never     Social History     Tobacco Use   Smoking Status Never Smoker   Smokeless Tobacco Never Used       Family History: History reviewed  No pertinent family history  MEDS & ALLERGIES:  all current active meds have been reviewed and current meds:   Current Facility-Administered Medications   Medication Dose Route Frequency    aluminum-magnesium hydroxide-simethicone (MYLANTA) 200-200-20 mg/5 mL oral suspension 30 mL  30 mL Oral Q4H PRN    nitroglycerin (NITROSTAT) SL tablet 0 4 mg  0 4 mg Sublingual Q5 Min PRN    pantoprazole (PROTONIX) EC tablet 40 mg  40 mg Oral Early Morning        No Known Allergies    OBJECTIVE:  Vitals:   Vitals:    20 0659   BP: 140/68   Pulse: 87   Resp: 16   Temp: 98 7 °F (37 1 °C)   SpO2: 96%     Body mass index is 30 4 kg/m²      Systolic (77ROB), NCR:977 , Min:140 , UNM:834     Diastolic (00OED), SW, Min:68, Max:90      Intake/Output Summary (Last 24 hours) at 2020 1037  Last data filed at 2020 0851  Gross per 24 hour   Intake 360 ml   Output --   Net 360 ml     Weight (last 2 days)     Date/Time   Weight    20 09:15:21   96 1 (211 86)    20 0544   100 (220 9)            Invasive Devices     Peripheral Intravenous Line            Peripheral IV 20 Right Antecubital 1 day                PHYSICAL EXAMS:  General:  Patient is not in acute distress, laying in the bed comfortably, awake, alert responding to commands  Head: Normocephalic, Atraumatic     HEENT: White sclera, pink conjunctiva  Neck:  Supple, no neck vein distention  Respiratory: clear to P/A  Cardiovascular:  PMI normal, S1-S2 normal, No  Murmurs, thrills, gallops, rubs, regular rhythm  GI:  Abdomen soft, non-tender, non-distended  Extremities: No edema, normal pulses  Integument:  No skin rashes or ulceration  Lymphatic:  No cervical or inguinal lymphadenopathy  Neurologic:  Patient is awake alert, responding to command, oriented to person, place and time     LABORATORY RESULTS:  Results from last 7 days   Lab Units 07/18/20  2212 07/18/20  1650 07/18/20  1241   TROPONIN I ng/mL 0 04 0 04 0 03     CBC with diff:   Results from last 7 days   Lab Units 07/19/20  0450 07/18/20  0606   WBC Thousand/uL 4 70 5 17   HEMOGLOBIN g/dL 7 7* 8 2*   HEMATOCRIT % 28 8* 30 2*   MCV fL 73* 72*   PLATELETS Thousands/uL 178 187   MCH pg 19 5* 19 5*   MCHC g/dL 26 7* 27 2*   RDW % 17 2* 17 4*   MPV fL 10 1 9 5   NRBC AUTO /100 WBCs  --  0       CMP:  Results from last 7 days   Lab Units 07/19/20  0450 07/18/20  0606   POTASSIUM mmol/L 3 8 3 8   CHLORIDE mmol/L 106 103   CO2 mmol/L 26 28   BUN mg/dL 7 7   CREATININE mg/dL 0 80 0 81   CALCIUM mg/dL 8 1* 8 7   AST U/L  --  31   ALT U/L  --  43   ALK PHOS U/L  --  115   EGFR ml/min/1 73sq m 87 86       BMP:  Results from last 7 days   Lab Units 07/19/20  0450 07/18/20  0606   POTASSIUM mmol/L 3 8 3 8   CHLORIDE mmol/L 106 103   CO2 mmol/L 26 28   BUN mg/dL 7 7   CREATININE mg/dL 0 80 0 81   CALCIUM mg/dL 8 1* 8 7       Results from last 7 days   Lab Units 07/19/20  0450   NT-PRO BNP pg/mL 28      Results from last 7 days   Lab Units 07/19/20  0450   MAGNESIUM mg/dL 2 3                   Lipid Profile:   No results found for: CHOL  No results found for: HDL  No results found for: LDLCALC  No results found for: TRIG    Cardiac testing:   No results found for this or any previous visit  No results found for this or any previous visit  No procedure found  No results found for this or any previous visit  Imaging:   I have personally reviewed pertinent reports  EKG reviewed personally:  None available for my review    Telemetry reviewed personally:   Patient on telemetry monitoring      Code Status: Level 1 - Full Code    Counseling / Coordination of Care  Total floor / unit time spent today 20 minutes  Greater than 50% of total time was spent with the patient and / or family counseling and / or coordination of care  A description of the counseling / coordination of care: Review of history, current assessment, development of a plan      [address] [facility]  7/19/2020,10:37 AM

## 2020-07-19 NOTE — PLAN OF CARE
Problem: PAIN - ADULT  Goal: Verbalizes/displays adequate comfort level or baseline comfort level  Description  Interventions:  - Encourage patient to monitor pain and request assistance  - Assess pain using appropriate pain scale  - Administer analgesics based on type and severity of pain and evaluate response  - Implement non-pharmacological measures as appropriate and evaluate response  - Consider cultural and social influences on pain and pain management  - Notify physician/advanced practitioner if interventions unsuccessful or patient reports new pain  Outcome: Progressing     Problem: DISCHARGE PLANNING  Goal: Discharge to home or other facility with appropriate resources  Description  INTERVENTIONS:  - Identify barriers to discharge w/patient and caregiver  - Arrange for needed discharge resources and transportation as appropriate  - Identify discharge learning needs (meds, wound care, etc )  - Arrange for interpretive services to assist at discharge as needed  - Refer to Case Management Department for coordinating discharge planning if the patient needs post-hospital services based on physician/advanced practitioner order or complex needs related to functional status, cognitive ability, or social support system  Outcome: Progressing     Problem: Knowledge Deficit  Goal: Patient/family/caregiver demonstrates understanding of disease process, treatment plan, medications, and discharge instructions  Description  Complete learning assessment and assess knowledge base    Interventions:  - Provide teaching at level of understanding  - Provide teaching via preferred learning methods  Outcome: Progressing     Problem: CARDIOVASCULAR - ADULT  Goal: Maintains optimal cardiac output and hemodynamic stability  Description  INTERVENTIONS:  - Monitor I/O, vital signs and rhythm  - Monitor for S/S and trends of decreased cardiac output  - Administer and titrate ordered vasoactive medications to optimize hemodynamic stability  - Assess quality of pulses, skin color and temperature  - Assess for signs of decreased coronary artery perfusion  - Instruct patient to report change in severity of symptoms  Outcome: Progressing     Problem: RESPIRATORY - ADULT  Goal: Achieves optimal ventilation and oxygenation  Description  INTERVENTIONS:  - Assess for changes in respiratory status  - Assess for changes in mentation and behavior  - Position to facilitate oxygenation and minimize respiratory effort  - Oxygen administered by appropriate delivery if ordered  - Initiate smoking cessation education as indicated  - Encourage broncho-pulmonary hygiene including cough, deep breathe, Incentive Spirometry  - Assess the need for suctioning and aspirate as needed  - Assess and instruct to report SOB or any respiratory difficulty  - Respiratory Therapy support as indicated  Outcome: Progressing     Problem: GASTROINTESTINAL - ADULT  Goal: Minimal or absence of nausea and/or vomiting  Description  INTERVENTIONS:  - Administer IV fluids if ordered to ensure adequate hydration  - Maintain NPO status until nausea and vomiting are resolved  - Nasogastric tube if ordered  - Administer ordered antiemetic medications as needed  - Provide nonpharmacologic comfort measures as appropriate  - Advance diet as tolerated, if ordered  - Consider nutrition services referral to assist patient with adequate nutrition and appropriate food choices  Outcome: Progressing

## 2020-07-19 NOTE — ASSESSMENT & PLAN NOTE
Echo today  BNP normal  No acute exacerbation  Continue to monitor  Does not take any breathing treatments

## 2020-07-19 NOTE — ASSESSMENT & PLAN NOTE
GI consulted- EGD tomorrow  Hb down to 7 7  H/o erosive duodenitis and gastric ulcer for which he received a unit of PRBC in [facility] about a year ago  This chest pain could be from Peptic ulcer disease for which I have started him on protonix  Check FOBT today  Hold off on ASA and other antiplatelets  Cardiology to eval today

## 2020-07-19 NOTE — CONSULTS
Consultation - [facility]  Neetu Gunderson 67 y o  male MRN: [id number]  Unit/Bed#: -01 Encounter: [id number]        Consults    Reason for Consult / Principal Problem: Chest pain, anemia    HPI: This 67year old [address] speaking male (speaks some English) with a history of essential hypertension and chronic shortness of breath presented to the ED with the chief complaint of intermittent, non-exertional chest pains over the past several weeks  His son at the bedside translated for me  The patient states that these pains have, on occasion, been waking him from sleep  He had a pain last night that woke him from sleep  He states that the pains are a pressure like pain and not a burning pain  He denies heartburn symptoms  He denies any dysphagia  He admits to bloating, but denies diarrhea, constipation, rectal bleeding, melena, hematemesis, nose bleeds, hemoptysis, or hematuria  He states that he began taking pantoprazole 40 mg daily 2 weeks ago, once daily  The patient has the history of having severe anemia one year ago  He was admitted to the hospital in [facility] with a Hgb level of 3 6 and given numerous blood transfusions  The patient underwent an EGD at that time which showed a gastric ulcer and erosive duodenitis  A colonoscopy was not performed during this hospitalization  Following the diagnosis of this gastric ulcer, no followup EGD was performed  No other blood work is available in our system for a baseline CBC over the past year  According to the patient and son, the patient has not seen another physician over the past year since his hospitalization in [facility]     REVIEW OF SYSTEMS:    CONSTITUTIONAL: Denies any fever, chills, or rigors  Good appetite, and no recent weight loss  HEENT: No earache or tinnitus  Denies hearing loss or visual disturbances  CARDIOVASCULAR: No chest pain or palpitations     RESPIRATORY: Denies any cough, hemoptysis, shortness of breath or dyspnea on exertion  GASTROINTESTINAL: As noted in the History of Present Illness  GENITOURINARY: No problems with urination  Denies any hematuria or dysuria  NEUROLOGIC: No dizziness or vertigo, denies headaches  MUSCULOSKELETAL: Denies any muscle or joint pain  SKIN: Denies skin rashes or itching  ENDOCRINE: Denies excessive thirst  Denies intolerance to heat or cold  PSYCHOSOCIAL: Denies depression or anxiety  Denies any recent memory loss  Historical Information   History reviewed  No pertinent past medical history  History reviewed  No pertinent surgical history  Social History   Social History     Substance and Sexual Activity   Alcohol Use Yes    Frequency: 4 or more times a week    Drinks per session: 7 to 9     Social History     Substance and Sexual Activity   Drug Use Never     Social History     Tobacco Use   Smoking Status Never Smoker   Smokeless Tobacco Never Used     History reviewed  No pertinent family history  Meds/Allergies     Medications Prior to Admission   Medication    hydrochlorothiazide (HYDRODIURIL) 12 5 mg tablet     Current Facility-Administered Medications   Medication Dose Route Frequency    aluminum-magnesium hydroxide-simethicone (MYLANTA) 200-200-20 mg/5 mL oral suspension 30 mL  30 mL Oral Q4H PRN    metoprolol tartrate (LOPRESSOR) partial tablet 12 5 mg  12 5 mg Oral Q12H [address]    nitroglycerin (NITROSTAT) SL tablet 0 4 mg  0 4 mg Sublingual Q5 Min PRN    pantoprazole (PROTONIX) EC tablet 40 mg  40 mg Oral Early Morning       No Known Allergies        Objective     Blood pressure 140/68, pulse 87, temperature 98 7 °F (37 1 °C), resp  rate 16, height 5' 10" (1 778 m), weight 96 1 kg (211 lb 13 8 oz), SpO2 96 %        Intake/Output Summary (Last 24 hours) at 7/19/2020 1151  Last data filed at 7/19/2020 0851  Gross per 24 hour   Intake 360 ml   Output --   Net 360 ml         PHYSICAL EXAM:      General Appearance:   Alert, cooperative, no distress, appears stated age, obese    HEENT:   Normocephalic, atraumatic, anicteric, PERRLA   Neck:  Supple, symmetrical, trachea midline, no adenopathy;    thyroid: no enlargement/tenderness/nodules; no carotid  bruit or JVD    Lungs:   Clear to auscultation bilaterally; no rales, rhonchi or wheezing; respirations unlabored    Heart[de-identified]   S1 and S2 normal; regular rate and rhythm; no murmur, rub, or gallop  Abdomen:   Soft, non-tender, non-distended; normal bowel sounds; no masses, no organomegaly    Genitalia:   Deferred    Rectal:   Deferred    Extremities:  No cyanosis, clubbing or edema    Pulses:  2+ and symmetric all extremities    Skin:  Skin color, texture, turgor normal, no rashes or lesions    Lymph nodes:  No palpable cervical, axillary or inguinal lymphadenopathy        Lab Results:   Results from last 7 days   Lab Units 07/19/20  0450 07/18/20  0606   WBC Thousand/uL 4 70 5 17   HEMOGLOBIN g/dL 7 7* 8 2*   HEMATOCRIT % 28 8* 30 2*   PLATELETS Thousands/uL 178 187   NEUTROS PCT %  --  33*   LYMPHS PCT %  --  48*   MONOS PCT %  --  16*   EOS PCT %  --  2     Results from last 7 days   Lab Units 07/19/20  0450 07/18/20  0606   POTASSIUM mmol/L 3 8 3 8   CHLORIDE mmol/L 106 103   CO2 mmol/L 26 28   BUN mg/dL 7 7   CREATININE mg/dL 0 80 0 81   CALCIUM mg/dL 8 1* 8 7   ALK PHOS U/L  --  115   ALT U/L  --  43   AST U/L  --  31         Results from last 7 days   Lab Units 07/18/20  0606   LIPASE u/L 206       Imaging Studies: I have personally reviewed pertinent imaging studies  Xr Chest 1 View Portable    Result Date: 7/18/2020  Impression: No acute cardiopulmonary disease  Workstation performed: [id number]       ASSESSMENT and PLAN:      1  Chest pain, most likely non-cardiac  - Consider GERD or musculoskeletal as the etiology  - Will plan for EGD tomorrow, NPO after midnight  - Pantoprazole 40 mg bid orally    2   Iron deficiency anemia  - Hgb was 8 2 yesterday and 7 7 today  - no overt evidence of bleeding  - MCV is 72  - stool hemoccults ordered  - Patient should probably also undergo a colonoscopy in the near future, especially if the EGD is negative    If both EGD and colonoscopy are negative, then a VCE should be performed as an outpatient

## 2020-07-19 NOTE — ASSESSMENT & PLAN NOTE
Cardiac vs GI  See note under anemia for h/o GI issues    Has been having chest pain since 2-3 weeks off and on, non exertional, non pleuritic  No hypoxia associated, no diaphoresis  Non specific EKG changes  Will obtain a repeat EKG with next troponin  Trend troponins   If trends up then will involve cardiology  Currently no chest pain  Aortic etiologies like aneurysm or dissection low on differentials  He complains of gaseous abd distention which could be contributing to his chest pain but will rule ACS out with troponin first  Give mylanta and NTG  TSH pending

## 2020-07-19 NOTE — ASSESSMENT & PLAN NOTE
BP >136 systolic on admission  Now in 160s, does not take any HTN meds at home  Take hctz at home, Will initiate

## 2020-07-19 NOTE — ASSESSMENT & PLAN NOTE
Hb down to 7 7  H/o erosive duodenitis and gastric ulcer for which he received a unit of PRBC in [facility] about a year ago  This chest pain could be from Peptic ulcer disease for which I have started him on protonix  Check FOBT today  Hold off on ASA and other antiplatelets  Cardiology to eval today

## 2020-07-19 NOTE — ASSESSMENT & PLAN NOTE
Cardiac vs GI  See note under anemia for h/o GI issues  Started on BB    Has been having chest pain since 2-3 weeks off and on, non exertional, non pleuritic  No hypoxia associated, no diaphoresis  Non specific EKG changes  Will obtain a repeat EKG with next troponin  Trend troponins   If trends up then will involve cardiology  Currently no chest pain  Aortic etiologies like aneurysm or dissection low on differentials  He complains of gaseous abd distention which could be contributing to his chest pain but will rule ACS out with troponin first  Give mylanta and NTG  TSH pending

## 2020-07-20 ENCOUNTER — APPOINTMENT (INPATIENT)
Dept: GASTROENTEROLOGY | Facility: HOSPITAL | Age: 75
DRG: 384 | End: 2020-07-20
Attending: INTERNAL MEDICINE
Payer: MEDICARE

## 2020-07-20 ENCOUNTER — ANESTHESIA EVENT (INPATIENT)
Dept: GASTROENTEROLOGY | Facility: HOSPITAL | Age: 75
DRG: 384 | End: 2020-07-20
Payer: MEDICARE

## 2020-07-20 ENCOUNTER — APPOINTMENT (INPATIENT)
Dept: CT IMAGING | Facility: HOSPITAL | Age: 75
DRG: 384 | End: 2020-07-20
Payer: MEDICARE

## 2020-07-20 ENCOUNTER — ANESTHESIA (INPATIENT)
Dept: GASTROENTEROLOGY | Facility: HOSPITAL | Age: 75
DRG: 384 | End: 2020-07-20
Payer: MEDICARE

## 2020-07-20 VITALS
SYSTOLIC BLOOD PRESSURE: 141 MMHG | RESPIRATION RATE: 18 BRPM | WEIGHT: 211.86 LBS | TEMPERATURE: 97.1 F | OXYGEN SATURATION: 98 % | HEART RATE: 84 BPM | HEIGHT: 70 IN | BODY MASS INDEX: 30.33 KG/M2 | DIASTOLIC BLOOD PRESSURE: 76 MMHG

## 2020-07-20 LAB
ALBUMIN SERPL BCP-MCNC: 3.2 G/DL (ref 3.5–5)
ALP SERPL-CCNC: 86 U/L (ref 46–116)
ALT SERPL W P-5'-P-CCNC: 47 U/L (ref 12–78)
ANION GAP SERPL CALCULATED.3IONS-SCNC: 6 MMOL/L (ref 4–13)
AST SERPL W P-5'-P-CCNC: 41 U/L (ref 5–45)
BILIRUB DIRECT SERPL-MCNC: 0.18 MG/DL (ref 0–0.2)
BILIRUB SERPL-MCNC: 0.6 MG/DL (ref 0.2–1)
BUN SERPL-MCNC: 7 MG/DL (ref 5–25)
CALCIUM SERPL-MCNC: 8 MG/DL (ref 8.3–10.1)
CHLORIDE SERPL-SCNC: 106 MMOL/L (ref 100–108)
CO2 SERPL-SCNC: 26 MMOL/L (ref 21–32)
CREAT SERPL-MCNC: 0.81 MG/DL (ref 0.6–1.3)
ERYTHROCYTE [DISTWIDTH] IN BLOOD BY AUTOMATED COUNT: 17.2 % (ref 11.6–15.1)
GFR SERPL CREATININE-BSD FRML MDRD: 86 ML/MIN/1.73SQ M
GLUCOSE SERPL-MCNC: 114 MG/DL (ref 65–140)
HCT VFR BLD AUTO: 28.5 % (ref 36.5–49.3)
HCT VFR BLD AUTO: 28.6 % (ref 36.5–49.3)
HGB BLD-MCNC: 7.6 G/DL (ref 12–17)
HGB BLD-MCNC: 7.6 G/DL (ref 12–17)
MCH RBC QN AUTO: 19.4 PG (ref 26.8–34.3)
MCHC RBC AUTO-ENTMCNC: 26.6 G/DL (ref 31.4–37.4)
MCV RBC AUTO: 73 FL (ref 82–98)
PLATELET # BLD AUTO: 157 THOUSANDS/UL (ref 149–390)
PMV BLD AUTO: 9.5 FL (ref 8.9–12.7)
POTASSIUM SERPL-SCNC: 3.8 MMOL/L (ref 3.5–5.3)
PROT SERPL-MCNC: 7.5 G/DL (ref 6.4–8.2)
RBC # BLD AUTO: 3.91 MILLION/UL (ref 3.88–5.62)
SODIUM SERPL-SCNC: 138 MMOL/L (ref 136–145)
WBC # BLD AUTO: 5.2 THOUSAND/UL (ref 4.31–10.16)

## 2020-07-20 PROCEDURE — 88313 SPECIAL STAINS GROUP 2: CPT | Performed by: PATHOLOGY

## 2020-07-20 PROCEDURE — 0DB98ZX EXCISION OF DUODENUM, VIA NATURAL OR ARTIFICIAL OPENING ENDOSCOPIC, DIAGNOSTIC: ICD-10-PCS | Performed by: INTERNAL MEDICINE

## 2020-07-20 PROCEDURE — 88342 IMHCHEM/IMCYTCHM 1ST ANTB: CPT | Performed by: PATHOLOGY

## 2020-07-20 PROCEDURE — 0DB68ZX EXCISION OF STOMACH, VIA NATURAL OR ARTIFICIAL OPENING ENDOSCOPIC, DIAGNOSTIC: ICD-10-PCS | Performed by: INTERNAL MEDICINE

## 2020-07-20 PROCEDURE — 85014 HEMATOCRIT: CPT | Performed by: FAMILY MEDICINE

## 2020-07-20 PROCEDURE — 80048 BASIC METABOLIC PNL TOTAL CA: CPT | Performed by: FAMILY MEDICINE

## 2020-07-20 PROCEDURE — 99239 HOSP IP/OBS DSCHRG MGMT >30: CPT | Performed by: FAMILY MEDICINE

## 2020-07-20 PROCEDURE — 43239 EGD BIOPSY SINGLE/MULTIPLE: CPT | Performed by: INTERNAL MEDICINE

## 2020-07-20 PROCEDURE — 85018 HEMOGLOBIN: CPT | Performed by: FAMILY MEDICINE

## 2020-07-20 PROCEDURE — 85027 COMPLETE CBC AUTOMATED: CPT | Performed by: FAMILY MEDICINE

## 2020-07-20 PROCEDURE — 80076 HEPATIC FUNCTION PANEL: CPT | Performed by: FAMILY MEDICINE

## 2020-07-20 PROCEDURE — 88305 TISSUE EXAM BY PATHOLOGIST: CPT | Performed by: PATHOLOGY

## 2020-07-20 PROCEDURE — 74160 CT ABDOMEN W/CONTRAST: CPT

## 2020-07-20 RX ORDER — PROPOFOL 10 MG/ML
INJECTION, EMULSION INTRAVENOUS AS NEEDED
Status: DISCONTINUED | OUTPATIENT
Start: 2020-07-20 | End: 2020-07-20 | Stop reason: SURG

## 2020-07-20 RX ORDER — SODIUM CHLORIDE, SODIUM LACTATE, POTASSIUM CHLORIDE, CALCIUM CHLORIDE 600; 310; 30; 20 MG/100ML; MG/100ML; MG/100ML; MG/100ML
50 INJECTION, SOLUTION INTRAVENOUS CONTINUOUS
Status: DISCONTINUED | OUTPATIENT
Start: 2020-07-20 | End: 2020-07-20

## 2020-07-20 RX ORDER — PANTOPRAZOLE SODIUM 40 MG/1
40 TABLET, DELAYED RELEASE ORAL
Qty: 30 TABLET | Refills: 0 | Status: SHIPPED | OUTPATIENT
Start: 2020-07-21 | End: 2020-08-10 | Stop reason: SDUPTHER

## 2020-07-20 RX ORDER — SODIUM CHLORIDE, SODIUM LACTATE, POTASSIUM CHLORIDE, CALCIUM CHLORIDE 600; 310; 30; 20 MG/100ML; MG/100ML; MG/100ML; MG/100ML
INJECTION, SOLUTION INTRAVENOUS CONTINUOUS PRN
Status: DISCONTINUED | OUTPATIENT
Start: 2020-07-20 | End: 2020-07-20 | Stop reason: SURG

## 2020-07-20 RX ORDER — LIDOCAINE HYDROCHLORIDE 20 MG/ML
INJECTION, SOLUTION EPIDURAL; INFILTRATION; INTRACAUDAL; PERINEURAL AS NEEDED
Status: DISCONTINUED | OUTPATIENT
Start: 2020-07-20 | End: 2020-07-20 | Stop reason: SURG

## 2020-07-20 RX ADMIN — METOPROLOL TARTRATE 12.5 MG: 25 TABLET ORAL at 07:31

## 2020-07-20 RX ADMIN — PROPOFOL 30 MG: 10 INJECTION, EMULSION INTRAVENOUS at 11:42

## 2020-07-20 RX ADMIN — PROPOFOL 30 MG: 10 INJECTION, EMULSION INTRAVENOUS at 11:46

## 2020-07-20 RX ADMIN — SODIUM CHLORIDE, SODIUM LACTATE, POTASSIUM CHLORIDE, AND CALCIUM CHLORIDE: .6; .31; .03; .02 INJECTION, SOLUTION INTRAVENOUS at 11:07

## 2020-07-20 RX ADMIN — PROPOFOL 100 MG: 10 INJECTION, EMULSION INTRAVENOUS at 11:40

## 2020-07-20 RX ADMIN — LIDOCAINE HYDROCHLORIDE 100 MG: 20 INJECTION, SOLUTION EPIDURAL; INFILTRATION; INTRACAUDAL; PERINEURAL at 11:40

## 2020-07-20 RX ADMIN — PROPOFOL 30 MG: 10 INJECTION, EMULSION INTRAVENOUS at 11:44

## 2020-07-20 RX ADMIN — IOHEXOL 100 ML: 350 INJECTION, SOLUTION INTRAVENOUS at 17:11

## 2020-07-20 RX ADMIN — PANTOPRAZOLE SODIUM 40 MG: 40 TABLET, DELAYED RELEASE ORAL at 07:19

## 2020-07-20 NOTE — ANESTHESIA PREPROCEDURE EVALUATION
Review of Systems/Medical History  Patient summary reviewed  Chart reviewed  No history of anesthetic complications     Cardiovascular  Exercise comment: Able to climb flight of stairs, but with shortness of breath, unchanged over past year  Denies chest pain with exertion Hypertension ,    Pulmonary  Not a smoker (Quit 35 years ago) ,        GI/Hepatic    PUD,   Comment: Confirmed NPO appropriate    Hx of gastric ulcer and duodenitis          Endo/Other     GYN       Hematology  Anemia ,     Musculoskeletal       Neurology   Psychology           Physical Exam    Airway    Mallampati score: II  TM Distance: >3 FB  Neck ROM: full     Dental       Cardiovascular      Pulmonary      Other Findings    7/19/20 TTE:  LEFT VENTRICLE:  Systolic function was normal  LVEF >60%  Although no diagnostic regional wall motion abnormality was identified, this possibility cannot be completely excluded on the basis of this study  Wall thickness was mildly increased  There was mild concentric hypertrophy  Doppler parameters were consistent with abnormal left ventricular relaxation (grade 1 diastolic dysfunction)      LEFT ATRIUM:  The atrium was mildly dilated      MITRAL VALVE:  There was mild to moderate annular calcification  There was trace regurgitation      TRICUSPID VALVE:  There was mild regurgitation  Anesthesia Plan  ASA Score- 3     Anesthesia Type- IV sedation with anesthesia with ASA Monitors  Additional Monitors:   Airway Plan:     Comment: I discussed the risks and benefits of IV sedation anesthesia including the possibility of the need to convert to general anesthesia and the potential risk of awareness    Plan Factors-    Induction- intravenous  Postoperative Plan-     Informed Consent- Anesthetic plan and risks discussed with patient

## 2020-07-20 NOTE — SOCIAL WORK
CM met w/ pt & family at bedside for d/c planning assessment  Introduced self and role  Pt family reports that he lives w/ wife in 1 story home w/ 1st floor set up and 2 steps to enter  Pt is independent  W/ all ADLS & Ambulation without assistive device  Pt uses [facility] for pharmacy needs and has no issues w  [facility]  No POA  Pt has never had short term rehab, psych or D & A Admission history  [facility]    CM reviewed d/c planning process including the following: identifying help at home, patient preference for d/c planning needs, Discharge Lounge, [facility] Meds to Bed program, availability of treatment team to discuss questions or concerns patient and/or family may have regarding understanding medications and recognizing signs and symptoms once discharged  CM also encouraged patient to follow up with all recommended appointments after discharge  Patient advised of importance for patient and family to participate in managing patients medical well being

## 2020-07-20 NOTE — DISCHARGE INSTRUCTIONS
Diet for Stomach Ulcers and Gastritis   WHAT YOU NEED TO KNOW:   A diet for stomach ulcers and gastritis is a meal plan that limits foods that irritate your stomach  Certain foods may worsen symptoms such as stomach pain, bloating, heartburn, or indigestion  DISCHARGE INSTRUCTIONS:   Foods to limit or avoid:  You may need to avoid acidic, spicy, or high-fat foods  Not all foods affect everyone the same way  You will need to learn which foods worsen your symptoms and limit those foods  The following are some foods that may worsen ulcer or gastritis symptoms:  Beverages:      Whole milk and chocolate milk    Hot cocoa and cola    Any beverage with caffeine    Regular and decaffeinated coffee    Peppermint and spearmint tea    Green and black tea, with or without caffeine    Orange and grapefruit juices    Drinks that contain alcohol    Spices and seasonings:      Black and red pepper    Chili powder    Mustard seed and nutmeg    Other foods:      Dairy foods made from whole milk or cream    Chocolate    Spicy or strongly flavored cheeses, such as jalapeno or black pepper    Highly seasoned, high-fat meats, such as sausage, salami, panchal, ham, and cold cuts    Hot chiles and peppers    Tomato products, such as tomato paste, tomato sauce, or tomato juice  Foods to include:  Eat a variety of healthy foods from all the food groups  Eat fruits, vegetables, whole grains, and fat-free or low-fat dairy foods  Whole grains include whole-wheat breads, cereals, pasta, and brown rice  Choose lean meats, poultry (chicken and turkey), fish, beans, eggs, and nuts  A healthy meal plan is low in unhealthy fats, salt, and added sugar  Healthy fats include olive oil and canola oil  Ask your dietitian for more information about a healthy diet  Other helpful guidelines:   Do not eat right before bedtime  Stop eating at least 2 hours before bedtime  Eat small, frequent meals    Your stomach may tolerate small, frequent meals better than large meals  © 2017 [address] [employer] [employer] Information is for End User's use only and may not be sold, redistributed or otherwise used for commercial purposes  All illustrations and images included in CareNotes® are the copyrighted property of A D A M , Inc  or Ben Die[employer]  The above information is an  only  It is not intended as medical advice for individual conditions or treatments  Talk to your doctor, nurse or pharmacist before following any medical regimen to see if it is safe and effective for you  Dolor de pecho   CUIDADO AMBULATORIO:   Dolor de pecho  puede ser provocado por aldo variedad de condiciones, algunas no tan serias y otras que son de peligro mortal  También podría ser provocado por un ataque cardíaco o un coágulo de carol en un pulmón  En ocasiones el dolor torácico o la presión en el pecho pueden ser el resultado de haylee circulación de la carol al corazón (angina)  Aldo infección, inflamación o fractura en un hueso o cartílago en el pecho podría provocar dolor o molestia  El dolor en el pecho también puede ser un síntoma de un problema digestivo, taran el reflujo gastroesofágico o la acidez o aldo úlcera estomacal  Un ataque de ansiedad o aldo emoción humza taran el enojo también puede provocar dolor en el pecho  Es importante que programe aldo ernie con morales médico para determinar la causa de morales dolor de pecho  Síntomas comunes que usted podría presentar con el dolor de pecho:   · Fiebre o sudoración     · Náuseas o vómito     · Dificultad para respirar     · Yahoo o presión que se propaga del pecho a la espalda, mandíbula o brazo     · Ritmo cardíaco acelerado o lento     · Sensación de debilidad, cansancio o desmayo  Llame al 911 si presenta:   · Usted tiene alguno de los siguientes signos de un ataque cardíaco:      ¨ Estornudos, presión, o dolor en morales pecho que dura mas de 5 minutos o regresa      ¨ Malestar o dolor en morales espalda, Mara acevedo, abdomen, o brazo     ¨ Dificultad para respirar    ¨ Náuseas o vómito    ¨ Siente un desvanecimiento o tiene sudores fríos especialmente en el pecho o dificultad para respirar  Busque atención médica de inmediato si:   · La inflamación en morales pecho empeora, aun con tratamiento  · Usted tose o vomita carol  · Amanda heces son negras o tienen carol  · Usted no puede dejar de vomitar o le duele al tragar  Pregúntele a morales Pastor Parody vitaminas y minerales son adecuados para usted  · Usted tiene preguntas o inquietudes acerca de morales condición o cuidado  El tratamiento para el dolor de pecho  podría incluir medicamento para tratar amanda síntomas mientras morales médico encuentra la causa del dolor en morales pecho  · Medicamentos,  pueden administrarse para tratar la causa del dolor de pecho  Por ejemplo, analgésicos, medicamentos para la ansiedad o medicamentos para aumentar el flujo de carol al corazón  · No tome ciertos medicamentos sin antes preguntarle a morales médico   Estos incluyen LOVE, suplementos vitamínicos o a base de hierbas u hormonas (estrógeno o progestágeno)  Programe aldo ernie con morales médico dentro de 67 horas o taran se le indique:  Es posible que deba regresar para hacerse más pruebas para encontrar la causa del dolor de [place]  Es probable que lo refieran a un especialista, taran un cardiólogo o un gastroenterólogo  Anote amanda preguntas para que se acuerde de hacerlas riley amanda visitas  Consejos para vivir saludable:  Los siguientes son consejos generales de karime  Si morales dolor de pecho es causado por un problema cardíaco, morales médico le dará consejos específicos a seguir  · No fume  La nicotina y otros químicos contenidos en los cigarrillos y cigarros pueden causar daño a amanda pulmones y el corazón  Pida información a morales médico si usted actualmente fuma y necesita ayuda para dejar de fumar  Los cigarrillos electrónicos o tabaco sin humo todavía contienen nicotina   Consulte con morales médico antes de utilizar estos productos  · Consuma aldo variedad de alimentos saludables y bajos en grasas  Los alimentos saludables incluyen frutas, verduras, pan integral, productos lácteos bajos en grasa, frijoles, deven magras y pescado  Pida más información acerca de aldo dieta saludable para el corazón  · Pregunte acerca de la [place]  Tao médico le dirá cuáles actividades limitar y cuáles evitar  Pregunte cuándo [employer], regresar a tao trabajo y [employer]  Pida más información acerca de un plan de ejercicio adecuado para usted  · Mantenga un peso saludable  Consulte con tao médico cuánto debería pesar  Solicite que lo ayude a crear un plan para bajar de peso si tiene sobrepeso  · Póngase la vacuna de la gripe y la neumonía  Todos los adultos deberían recibir la vacuna de la influenza (gripe)  Vacúnese cada año francis pronto taran la vacuna esté disponible  La vacuna neumocócica se le aplica a adultos de 72 o más años de [place]  La vacuna se aplica cada 5 años para prevenir enfermedades neumocócicas, taran la neumonía  © 2017 [address] [employer] Information is for End User's use only and may not be sold, redistributed or otherwise used for commercial purposes  All illustrations and images included in CareNotes® are the copyrighted property of [employer]  or Ben Diez  Esta información es sólo para uso en educación  Tao intención no es darle un consejo médico sobre enfermedades o tratamientos  Colsulte con tao [employer] farmacéutico antes de seguir cualquier régimen médico para saber si es seguro y efectivo para usted

## 2020-07-20 NOTE — DISCHARGE SUMMARY
Discharge Summary - Bonnie Dancer 67 y o  male MRN: [id number]    Unit/Bed#: -01 Encounter: [id number]    Admission Date:   Admission Orders (From admission, onward)     Ordered        07/19/20 1626  Inpatient Admission  Once         07/18/20 0720  Place in Observation  Once                     Admitting Diagnosis: Chest pain [R07 9]  Atypical chest pain [R07 89]    HPI: Bonnie Dancer is a 67 y o  male who presents with no significant past medical history presents with left-sided chest pain  Chest pain has been intermittently going on since 2-3 weeks  He came into the ER today because he was unable to sleep because of the chest pain  Location is left-sided radiating into the back, no diaphoresis, no shortness of breath and though he is chronically short of breath but there is no acute worsening  5/10, worsens with laying down and improves with sitting up but this trend is not present at all times  He says that he quit smoking about 30 years ago  The chest pain is described as grabbing and gaslike pain  In the ED he had an EKG done which shows nonspecific ST T wave changes and will need repeat EKG  First set of troponins is negative  EKG does not show any focal consolidations  Procedures Performed:   Orders Placed This Encounter   Procedures    ED ECG Documentation Only       Summary of Hospital Course:  After hospitalization patient was followed by our hospitalist Service, Cardiology Service and gastroenterology service  Patient's troponins were not elevated  Cardiology started the patient on metoprolol and recommended to follow-up outpatient  Patient underwent echocardiogram which was systolic function to be normal with ejection fraction of greater than 60%  There is mild concentric hypertrophy indicative of hypertension  Metoprolol will help sheree the hypertension  Patient continue to have chest pain and dyspepsia and gastric ulcer of which she has a history in the past was a concern  Patient underwent EGD which showed gastric ulcer and the esophageal varices  LFTs were normal   Patient has been recommended to get a CT of his abdomen without contrast   To follow-up with gastroenterology within a week  He will need a colonoscopy and video capsule endoscopy  He has been started on Protonix every day  Significant Findings, Care, Treatment and Services Provided:     Echocardiogram:  LEFT VENTRICLE:  Systolic function was normal  LVEF >60%  Although no diagnostic regional wall motion abnormality was identified, this possibility cannot be completely excluded on the basis of this study  Wall thickness was mildly increased  There was mild concentric hypertrophy  Doppler parameters were consistent with abnormal left ventricular relaxation (grade 1 diastolic dysfunction)      LEFT ATRIUM:  The atrium was mildly dilated      MITRAL VALVE:  There was mild to moderate annular calcification  There was trace regurgitation      TRICUSPID VALVE:  There was mild regurgitation  EGD:     IMPRESSION:  Trace esophageal varices in the lower esophagus  Gastric ulcer  Normal duoenum      RECOMMENDATIONs:     - LFTs and abdominal US  - continue PPI  - follow up biopsy result  - repeat EGD within 3 months     - outpatient for colonoscopy and VCE    Complications: none    Discharge Diagnosis:   · gastric ulcer causing chest pain  · HTN  · Esophageal varices      Condition at Discharge: stable     General- Awake, alert and oriented x 3, looks comfortable  HEENT- Normocephalic, atraumatic, oral mucosa- moist  Neck- Supple, No carotid bruit, no JVD  CVS- Normal S1/ S2, Regular rate and rhythm, No murmur, No edema  Respiratory system- B/L clear breath sounds, no wheezing  Abdomen- Soft, Non distended, no tenderness, Bowel sound- present 4 quads  Genitourinary- No suprapubic tenderness, No CVA tenderness  Skin- No new bruise or rash  Musculoskeletal- No gross deformity  Psych- No acute psychosis  CNS- CN II- XII grossly intact, No acute focal neurologic deficit noted        Discharge instructions/Information to patient and family:   See after visit summary for information provided to patient and family  Provisions for Follow-Up Care:  See after visit summary for information related to follow-up care and any pertinent home health orders  PCP: No primary care provider on file  Disposition: Home    Planned Readmission: No      Discharge Statement   I spent 55 minutes discharging the patient  This time was spent on the day of discharge  I had direct contact with the patient on the day of discharge  Additional documentation is required if more than 30 minutes were spent on discharge  Discharge Medications:  See after visit summary for reconciled discharge medications provided to patient and family

## 2020-07-20 NOTE — ANESTHESIA POSTPROCEDURE EVALUATION
Post-Op Assessment Note    CV Status:  Stable  Pain Score: 0    Pain management: adequate     Mental Status:  Sleepy   Hydration Status:  Euvolemic   PONV Controlled:  Controlled   Airway Patency:  Patent   Post Op Vitals Reviewed: Yes      Staff: CRNA           /74 (07/20/20 1154)    Temp 97 7 °F (36 5 °C) (07/20/20 1154)    Pulse 82 (07/20/20 1154)   Resp 18 (07/20/20 1154)    SpO2 91 % (07/20/20 1154)

## 2020-07-29 NOTE — PHYSICIAN ADVISOR
Current patient class: Inpatient  The patient is currently on Hospital Day: 3 at [address]      The patient was admitted to the hospital at 1626 on 7/19/20 for the following diagnosis:  Chest pain [R07 9]  Atypical chest pain [R07 89]       There is documentation in the medical record of an expected length of stay of at least 2 midnights  The patient is therefore expected to satisfy the 2 midnight benchmark and given the 2 midnight presumption is appropriate for INPATIENT ADMISSION  Given this expectation of a satisfying stay, CMS instructs us that the patient is most often appropriate for inpatient admission under part A provided medical necessity is documented in the chart  After review of the relevant documentation, labs, vital signs and test results, the patient is appropriate for INPATIENT ADMISSION  Admission to the hospital as an inpatient is a complex decision making process which requires the practitioner to consider the patients presenting complaint, history and physical examination and all relevant testing  With this in mind, in this case, the patient was deemed appropriate for INPATIENT ADMISSION  After review of the documentation and testing available at the time of the admission I concur with this clinical determination of medical necessity  Rationale is as follows: The patient is a 67 yrs old Male who presented to the ED at 7/18/2020  5:32 AM with a chief complaint of Chest Pain (pt reports left sided chest pain that started yesterday; pt reports not being able to sleep all night; pain radiates to back)     Patient admitted with a report of left-sided chest pain radiating to his back, abdominal discomfort and increased blood pressure  He was admitted to rule out ACS  His troponins were negative and was about to be discharged when he again developed chest pain and orthopnea    He was evaluated by Cardiology and they recommended a 2D echo and starting the patient on metoprolol  They felt that if the echo was WNL this could be a GI problem  The patient's hemoglobin dropped from 8 2 to 7 6 and was evaluated by GI  They performed an EGD and found a gastric ulcer and esophageal varices  With the need for Cardiology and GI consultations to determine the etiology of the patient's chest pain, it would be appropriate to consider a 2 MN admission class as appropriate  The patients vitals on arrival were ED Triage Vitals [07/18/20 0544]   Temperature Pulse Respirations Blood Pressure SpO2   98 3 °F (36 8 °C) 101 (!) 25 (!) 182/86 99 %      Temp Source Heart Rate Source Patient Position - Orthostatic VS BP Location FiO2 (%)   Oral Monitor Lying Right arm --      Pain Score       2           Past Medical History:   Diagnosis Date    Hypertension      Past Surgical History:   Procedure Laterality Date    EGD             Consults have been placed to:   IP CONSULT TO CARDIOLOGY  IP CONSULT TO GASTROENTEROLOGY    Vitals:    07/20/20 1205 07/20/20 1215 07/20/20 1252 07/20/20 1522   BP: 118/67 128/80 163/74 141/76   Pulse: 91 81 78 84   Resp: (!) 27 (!) 26 18 18   Temp:   98 °F (36 7 °C) (!) 97 1 °F (36 2 °C)   TempSrc:       SpO2: 93% 96% 99% 98%   Weight:       Height:           Most recent labs:    No results for input(s): WBC, HGB, HCT, PLT, K, NA, CALCIUM, BUN, CREATININE, LIPASE, AMYLASE, INR, TROPONINI, CKTOTAL, AST, ALT, ALKPHOS, BILITOT in the last 72 hours  Scheduled Meds:  Continuous Infusions:  No current facility-administered medications for this encounter  PRN Meds:      Surgical procedures (if appropriate):

## 2020-08-04 ENCOUNTER — HOSPITAL ENCOUNTER (INPATIENT)
Facility: HOSPITAL | Age: 75
LOS: 1 days | Discharge: HOME | DRG: 812 | End: 2020-08-06
Attending: EMERGENCY MEDICINE | Admitting: GENERAL PRACTICE
Payer: MEDICARE

## 2020-08-04 ENCOUNTER — APPOINTMENT (EMERGENCY)
Dept: RADIOLOGY | Facility: HOSPITAL | Age: 75
DRG: 812 | End: 2020-08-04
Payer: MEDICARE

## 2020-08-04 DIAGNOSIS — R06.00 DYSPNEA ON EXERTION: ICD-10-CM

## 2020-08-04 DIAGNOSIS — R06.00 DYSPNEA: Primary | ICD-10-CM

## 2020-08-04 DIAGNOSIS — K27.9 PEPTIC ULCER DISEASE: ICD-10-CM

## 2020-08-04 DIAGNOSIS — D64.9 ANEMIA, UNSPECIFIED TYPE: ICD-10-CM

## 2020-08-04 PROBLEM — R06.09 DYSPNEA ON EXERTION: Status: ACTIVE | Noted: 2020-07-18

## 2020-08-04 LAB
ALBUMIN SERPL BCP-MCNC: 3.4 G/DL (ref 3.5–5)
ALP SERPL-CCNC: 113 U/L (ref 46–116)
ALT SERPL W P-5'-P-CCNC: 44 U/L (ref 12–78)
ANION GAP SERPL CALCULATED.3IONS-SCNC: 9 MMOL/L (ref 4–13)
APTT PPP: 31 SECONDS (ref 23–37)
AST SERPL W P-5'-P-CCNC: 29 U/L (ref 5–45)
ATRIAL RATE: 95 BPM
BASOPHILS # BLD AUTO: 0.04 THOUSANDS/ΜL (ref 0–0.1)
BASOPHILS NFR BLD AUTO: 1 % (ref 0–1)
BILIRUB SERPL-MCNC: 0.4 MG/DL (ref 0.2–1)
BUN SERPL-MCNC: 7 MG/DL (ref 5–25)
CALCIUM SERPL-MCNC: 8.3 MG/DL (ref 8.3–10.1)
CHLORIDE SERPL-SCNC: 104 MMOL/L (ref 100–108)
CO2 SERPL-SCNC: 26 MMOL/L (ref 21–32)
CREAT SERPL-MCNC: 0.88 MG/DL (ref 0.6–1.3)
D DIMER PPP FEU-MCNC: 0.59 UG/ML FEU
EOSINOPHIL # BLD AUTO: 0.12 THOUSAND/ΜL (ref 0–0.61)
EOSINOPHIL NFR BLD AUTO: 2 % (ref 0–6)
ERYTHROCYTE [DISTWIDTH] IN BLOOD BY AUTOMATED COUNT: 17.5 % (ref 11.6–15.1)
GFR SERPL CREATININE-BSD FRML MDRD: 83 ML/MIN/1.73SQ M
GLUCOSE SERPL-MCNC: 138 MG/DL (ref 65–140)
HCT VFR BLD AUTO: 26.9 % (ref 36.5–49.3)
HGB BLD-MCNC: 7.3 G/DL (ref 12–17)
IMM GRANULOCYTES # BLD AUTO: 0.02 THOUSAND/UL (ref 0–0.2)
IMM GRANULOCYTES NFR BLD AUTO: 0 % (ref 0–2)
INR PPP: 1.15 (ref 0.84–1.19)
LYMPHOCYTES # BLD AUTO: 2.12 THOUSANDS/ΜL (ref 0.6–4.47)
LYMPHOCYTES NFR BLD AUTO: 42 % (ref 14–44)
MAGNESIUM SERPL-MCNC: 2.2 MG/DL (ref 1.6–2.6)
MCH RBC QN AUTO: 19.2 PG (ref 26.8–34.3)
MCHC RBC AUTO-ENTMCNC: 27.1 G/DL (ref 31.4–37.4)
MCV RBC AUTO: 71 FL (ref 82–98)
MONOCYTES # BLD AUTO: 0.83 THOUSAND/ΜL (ref 0.17–1.22)
MONOCYTES NFR BLD AUTO: 17 % (ref 4–12)
NEUTROPHILS # BLD AUTO: 1.88 THOUSANDS/ΜL (ref 1.85–7.62)
NEUTS SEG NFR BLD AUTO: 38 % (ref 43–75)
NRBC BLD AUTO-RTO: 0 /100 WBCS
NT-PROBNP SERPL-MCNC: 27 PG/ML
P AXIS: 46 DEGREES
PLATELET # BLD AUTO: 137 THOUSANDS/UL (ref 149–390)
PMV BLD AUTO: 9.1 FL (ref 8.9–12.7)
POTASSIUM SERPL-SCNC: 3.5 MMOL/L (ref 3.5–5.3)
PR INTERVAL: 134 MS
PROT SERPL-MCNC: 7.8 G/DL (ref 6.4–8.2)
PROTHROMBIN TIME: 14.9 SECONDS (ref 11.6–14.5)
QRS AXIS: -41 DEGREES
QRSD INTERVAL: 106 MS
QT INTERVAL: 376 MS
QTC INTERVAL: 472 MS
RBC # BLD AUTO: 3.8 MILLION/UL (ref 3.88–5.62)
SODIUM SERPL-SCNC: 139 MMOL/L (ref 136–145)
T WAVE AXIS: 100 DEGREES
TROPONIN I SERPL-MCNC: <0.02 NG/ML
VENTRICULAR RATE: 95 BPM
WBC # BLD AUTO: 5.01 THOUSAND/UL (ref 4.31–10.16)

## 2020-08-04 PROCEDURE — 84484 ASSAY OF TROPONIN QUANT: CPT | Performed by: PHYSICIAN ASSISTANT

## 2020-08-04 PROCEDURE — 99220 PR INITIAL OBSERVATION CARE/DAY 70 MINUTES: CPT | Performed by: PHYSICIAN ASSISTANT

## 2020-08-04 PROCEDURE — 83880 ASSAY OF NATRIURETIC PEPTIDE: CPT | Performed by: PHYSICIAN ASSISTANT

## 2020-08-04 PROCEDURE — 99284 EMERGENCY DEPT VISIT MOD MDM: CPT | Performed by: PHYSICIAN ASSISTANT

## 2020-08-04 PROCEDURE — 85730 THROMBOPLASTIN TIME PARTIAL: CPT | Performed by: PHYSICIAN ASSISTANT

## 2020-08-04 PROCEDURE — 85610 PROTHROMBIN TIME: CPT | Performed by: PHYSICIAN ASSISTANT

## 2020-08-04 PROCEDURE — 80053 COMPREHEN METABOLIC PANEL: CPT | Performed by: PHYSICIAN ASSISTANT

## 2020-08-04 PROCEDURE — 30233N1 TRANSFUSION OF NONAUTOLOGOUS RED BLOOD CELLS INTO PERIPHERAL VEIN, PERCUTANEOUS APPROACH: ICD-10-PCS | Performed by: GENERAL PRACTICE

## 2020-08-04 PROCEDURE — 83735 ASSAY OF MAGNESIUM: CPT | Performed by: PHYSICIAN ASSISTANT

## 2020-08-04 PROCEDURE — 85025 COMPLETE CBC W/AUTO DIFF WBC: CPT | Performed by: PHYSICIAN ASSISTANT

## 2020-08-04 PROCEDURE — 71045 X-RAY EXAM CHEST 1 VIEW: CPT

## 2020-08-04 PROCEDURE — 85379 FIBRIN DEGRADATION QUANT: CPT | Performed by: PHYSICIAN ASSISTANT

## 2020-08-04 PROCEDURE — 93005 ELECTROCARDIOGRAM TRACING: CPT

## 2020-08-04 PROCEDURE — 93010 ELECTROCARDIOGRAM REPORT: CPT | Performed by: INTERNAL MEDICINE

## 2020-08-04 PROCEDURE — 99285 EMERGENCY DEPT VISIT HI MDM: CPT

## 2020-08-04 PROCEDURE — 36415 COLL VENOUS BLD VENIPUNCTURE: CPT | Performed by: PHYSICIAN ASSISTANT

## 2020-08-04 RX ORDER — ACETAMINOPHEN 325 MG/1
650 TABLET ORAL EVERY 4 HOURS PRN
Status: DISCONTINUED | OUTPATIENT
Start: 2020-08-04 | End: 2020-08-06 | Stop reason: HOSPADM

## 2020-08-04 RX ORDER — PANTOPRAZOLE SODIUM 40 MG/1
40 TABLET, DELAYED RELEASE ORAL
Status: DISCONTINUED | OUTPATIENT
Start: 2020-08-05 | End: 2020-08-06

## 2020-08-04 RX ORDER — LANOLIN ALCOHOL/MO/W.PET/CERES
3 CREAM (GRAM) TOPICAL
Status: DISCONTINUED | OUTPATIENT
Start: 2020-08-04 | End: 2020-08-06 | Stop reason: HOSPADM

## 2020-08-04 RX ORDER — DOCUSATE SODIUM 100 MG/1
100 CAPSULE, LIQUID FILLED ORAL 2 TIMES DAILY
Status: DISCONTINUED | OUTPATIENT
Start: 2020-08-05 | End: 2020-08-06 | Stop reason: HOSPADM

## 2020-08-04 RX ORDER — ONDANSETRON 2 MG/ML
4 INJECTION INTRAMUSCULAR; INTRAVENOUS EVERY 6 HOURS PRN
Status: DISCONTINUED | OUTPATIENT
Start: 2020-08-04 | End: 2020-08-06 | Stop reason: HOSPADM

## 2020-08-04 RX ORDER — MAGNESIUM HYDROXIDE/ALUMINUM HYDROXICE/SIMETHICONE 120; 1200; 1200 MG/30ML; MG/30ML; MG/30ML
30 SUSPENSION ORAL EVERY 4 HOURS PRN
Status: DISCONTINUED | OUTPATIENT
Start: 2020-08-04 | End: 2020-08-06 | Stop reason: HOSPADM

## 2020-08-04 RX ADMIN — MELATONIN 3 MG: at 23:50

## 2020-08-04 RX ADMIN — METOPROLOL TARTRATE 12.5 MG: 25 TABLET ORAL at 23:50

## 2020-08-04 NOTE — ED PROVIDER NOTES
History  Chief Complaint   Patient presents with    Shortness of Breath     starting today, denies cough, denies fevers     66 yo male here with SOB starting around 30 minutes ago  Felt like he had just suddenly "became winded"  Rested and symptoms continued to persist  Symptoms now resolved  Denies n/v, diaphoresis or chills  No coughing  No fever  No chest pain  No abd pain  Reports his abdomen feels "tight"  Had a similar problem about one month ago and had full cardiac workup at that time and was cleared at that time  Also had endoscopy at that time  No known allergies  Reportedly has a h/o "liver problem"  No recent surgeries  Prior to Admission Medications   Prescriptions Last Dose Informant Patient Reported? Taking?   aluminum-magnesium hydroxide-simethicone (MYLANTA) 200-200-20 mg/5 mL suspension   No No   Sig: Take 30 mL by mouth every 4 (four) hours as needed for indigestion or heartburn   metoprolol tartrate (LOPRESSOR) 25 mg tablet   No No   Sig: Take 0 5 tablets (12 5 mg total) by mouth every 12 (twelve) hours   pantoprazole (PROTONIX) 40 mg tablet   No No   Sig: Take 1 tablet (40 mg total) by mouth daily in the early morning      Facility-Administered Medications: None       Past Medical History:   Diagnosis Date    Hypertension        Past Surgical History:   Procedure Laterality Date    EGD         History reviewed  No pertinent family history  I have reviewed and agree with the history as documented  E-Cigarette/Vaping    E-Cigarette Use Never User      E-Cigarette/Vaping Substances     Social History     Tobacco Use    Smoking status: Never Smoker    Smokeless tobacco: Never Used   Substance Use Topics    Alcohol use: Yes     Frequency: 4 or more times a week     Drinks per session: 7 to 9    Drug use: Never       Review of Systems   Constitutional: Negative for activity change, appetite change, chills, diaphoresis, fatigue, fever and unexpected weight change     HENT: Negative for congestion, rhinorrhea, sinus pressure, sore throat and trouble swallowing  Eyes: Negative for photophobia and visual disturbance  Respiratory: Positive for shortness of breath  Negative for apnea, cough, choking, chest tightness, wheezing and stridor  Cardiovascular: Positive for leg swelling  Negative for chest pain and palpitations  Gastrointestinal: Negative for abdominal distention, abdominal pain, blood in stool, constipation, diarrhea, nausea and vomiting  Genitourinary: Negative for decreased urine volume, difficulty urinating, dysuria, enuresis, flank pain, frequency, hematuria and urgency  Musculoskeletal: Negative for arthralgias, myalgias, neck pain and neck stiffness  Skin: Negative for color change, pallor, rash and wound  Allergic/Immunologic: Negative  Neurological: Negative for dizziness, tremors, syncope, weakness, light-headedness, numbness and headaches  Hematological: Negative  Psychiatric/Behavioral: Negative  All other systems reviewed and are negative  Physical Exam  Physical Exam  Vitals signs and nursing note reviewed  Constitutional:       General: He is not in acute distress  Appearance: Normal appearance  He is well-developed  He is not ill-appearing, toxic-appearing or diaphoretic  HENT:      Head: Normocephalic and atraumatic  Eyes:      General: Lids are normal       Pupils: Pupils are equal, round, and reactive to light  Neck:      Musculoskeletal: Normal range of motion and neck supple  Cardiovascular:      Rate and Rhythm: Normal rate and regular rhythm  Pulses: Normal pulses  No decreased pulses  Radial pulses are 2+ on the right side and 2+ on the left side  Heart sounds: Normal heart sounds, S1 normal and S2 normal  Heart sounds not distant  No murmur  No friction rub  No gallop  Pulmonary:      Effort: Pulmonary effort is normal  No tachypnea, bradypnea, accessory muscle usage or respiratory distress  Breath sounds: Normal breath sounds  No decreased breath sounds, wheezing, rhonchi or rales  Abdominal:      General: There is no distension  Palpations: Abdomen is soft  Abdomen is not rigid  Tenderness: There is no abdominal tenderness  There is no guarding or rebound  Musculoskeletal: Normal range of motion  General: No tenderness or deformity  Skin:     General: Skin is warm and dry  Coloration: Skin is not pale  Findings: No erythema or rash  Neurological:      Mental Status: He is alert and oriented to person, place, and time  GCS: GCS eye subscore is 4  GCS verbal subscore is 5  GCS motor subscore is 6  Cranial Nerves: No cranial nerve deficit     Psychiatric:         Speech: Speech normal          Vital Signs  ED Triage Vitals [08/04/20 1950]   Temperature Pulse Respirations Blood Pressure SpO2   98 2 °F (36 8 °C) 98 18 (!) 188/85 99 %      Temp Source Heart Rate Source Patient Position - Orthostatic VS BP Location FiO2 (%)   Oral Monitor Lying Right arm --      Pain Score       --           Vitals:    08/04/20 1950 08/04/20 2030   BP: (!) 188/85 149/74   Pulse: 98 96   Patient Position - Orthostatic VS: Lying Lying         Visual Acuity      ED Medications  Medications - No data to display    Diagnostic Studies  Results Reviewed     Procedure Component Value Units Date/Time    Troponin I [[id number]]     Lab Status:  No result Specimen:  Blood     NT-BNP PRO [[id number]]  (Normal) Collected:  08/04/20 2002    Lab Status:  Final result Specimen:  Blood from Arm, Right Updated:  08/04/20 2039     NT-proBNP 27 pg/mL     Magnesium [[id number]]  (Normal) Collected:  08/04/20 2002    Lab Status:  Final result Specimen:  Blood from Arm, Right Updated:  08/04/20 2039     Magnesium 2 2 mg/dL     Troponin I [[id number]]  (Normal) Collected:  08/04/20 2002    Lab Status:  Final result Specimen:  Blood from Arm, Right Updated:  08/04/20 2038     Troponin I <0 02 ng/mL D-Dimer [[id number]]  (Abnormal) Collected:  08/04/20 2002    Lab Status:  Final result Specimen:  Blood from Arm, Right Updated:  08/04/20 2028     D-Dimer, Quant 0 59 ug/ml FEU     APTT [[id number]]  (Normal) Collected:  08/04/20 2002    Lab Status:  Final result Specimen:  Blood from Arm, Right Updated:  08/04/20 2028     PTT 31 seconds     Protime-INR [[id number]]  (Abnormal) Collected:  08/04/20 2002    Lab Status:  Final result Specimen:  Blood from Arm, Right Updated:  08/04/20 2028     Protime 14 9 seconds      INR 1 15    Comprehensive metabolic panel [[id number]]  (Abnormal) Collected:  08/04/20 2002    Lab Status:  Final result Specimen:  Blood from Arm, Right Updated:  08/04/20 2026     Sodium 139 mmol/L      Potassium 3 5 mmol/L      Chloride 104 mmol/L      CO2 26 mmol/L      ANION GAP 9 mmol/L      BUN 7 mg/dL      Creatinine 0 88 mg/dL      Glucose 138 mg/dL      Calcium 8 3 mg/dL      AST 29 U/L      ALT 44 U/L      Alkaline Phosphatase 113 U/L      Total Protein 7 8 g/dL      Albumin 3 4 g/dL      Total Bilirubin 0 40 mg/dL      eGFR 83 ml/min/1 73sq m     Narrative:       [place] guidelines for Chronic Kidney Disease (CKD):     Stage 1 with normal or high GFR (GFR > 90 mL/min/1 73 square meters)    Stage 2 Mild CKD (GFR = 60-89 mL/min/1 73 square meters)    Stage 3A Moderate CKD (GFR = 45-59 mL/min/1 73 square meters)    Stage 3B Moderate CKD (GFR = 30-44 mL/min/1 73 square meters)    Stage 4 Severe CKD (GFR = 15-29 mL/min/1 73 square meters)    Stage 5 End Stage CKD (GFR <15 mL/min/1 73 square meters)  Note: GFR calculation is accurate only with a steady state creatinine    CBC and differential [[id number]]  (Abnormal) Collected:  08/04/20 2002    Lab Status:  Final result Specimen:  Blood from Arm, Right Updated:  08/04/20 2010     WBC 5 01 Thousand/uL      RBC 3 80 Million/uL      Hemoglobin 7 3 g/dL      Hematocrit 26 9 %      MCV 71 fL      MCH 19 2 pg      MCHC 27 1 g/dL      RDW 17 5 %      MPV 9 1 fL      Platelets 698 Thousands/uL      nRBC 0 /100 WBCs      Neutrophils Relative 38 %      Immat GRANS % 0 %      Lymphocytes Relative 42 %      Monocytes Relative 17 %      Eosinophils Relative 2 %      Basophils Relative 1 %      Neutrophils Absolute 1 88 Thousands/µL      Immature Grans Absolute 0 02 Thousand/uL      Lymphocytes Absolute 2 12 Thousands/µL      Monocytes Absolute 0 83 Thousand/µL      Eosinophils Absolute 0 12 Thousand/µL      Basophils Absolute 0 04 Thousands/µL                  XR chest portable   Final Result by Rachelle France DO (08/04 2049)      No acute cardiopulmonary disease  Workstation performed: [id number]                    Procedures  ECG 12 Lead Documentation Only    Date/Time: 8/4/2020 7:57 PM  Performed by: Amador Conrad PA-C  Authorized by: Amador Conrad PA-C     Indications / Diagnosis:  July 18, 2020  ECG reviewed by me, the ED Provider: yes    Patient location:  ED  Previous ECG:     Previous ECG:  Compared to current    Comparison ECG info:  Aug 2020    Similarity:  Changes noted    Comparison to cardiac monitor: Yes    Interpretation:     Interpretation: abnormal    Quality:     Tracing quality:  Limited by artifact  Rate:     ECG rate:  95    ECG rate assessment: normal    Rhythm:     Rhythm: sinus rhythm    Ectopy:     Ectopy: none    QRS:     QRS axis:  Normal    QRS intervals:  Normal  Conduction:     Conduction: normal    ST segments:     ST segments:  Abnormal  T waves:     T waves: normal               ED Course  ED Course as of Aug 04 2105   Tue Aug 04, 2020   2037 Age adjusted ddimer is negative  D-Dimer, Quant(!): 0 59   2039 Troponin I: <0 02       US AUDIT      Most Recent Value   Initial Alcohol Screen: US AUDIT-C    1  How often do you have a drink containing alcohol? 2 Filed at: 08/04/2020 2006   2  How many drinks containing alcohol do you have on a typical day you are drinking?    4 Filed at: 08/04/2020 2006   3a  Male UNDER 65: How often do you have five or more drinks on one occasion? 2 Filed at: 08/04/2020 2006   3b  FEMALE Any Age, or MALE 65+: How often do you have 4 or more drinks on one occassion? 0 Filed at: 08/04/2020 2006   Audit-C Score  (!) 8 Filed at: 08/04/2020 2006   Full Alcohol Screen: US AUDIT   4  How often during the last year have you found that you were not able to stop drinking once you had started? 0 Filed at: 08/04/2020 2006   5  How often during past year have you failed to do what was normally expected of you because of drinking? 0 Filed at: 08/04/2020 2006   6  How often in past year have you needed a first drink in the morning to get yourself going after a heavy drinking session? 0 Filed at: 08/04/2020 2006   7  How often in past year have you had feeling of guilt or remorse after drinking? 0 Filed at: 08/04/2020 2006   8  How often in past year have you been unable to remember what happened night before because you had been drinking? 0 Filed at: 08/04/2020 2006   9  Have you or someone else been injured as a result of your drinking? 0 Filed at: 08/04/2020 2006   10  Has a relative, friend, doctor or other health worker been concerned about your drinking and suggested you cut down?   0 Filed at: 08/04/2020 2006   AUDIT Total Score  8 Filed at: 08/04/2020 2006            HEART Risk Score      Most Recent Value   Heart Score Risk Calculator   History  0 Filed at: 08/04/2020 2040   ECG  1 Filed at: 08/04/2020 2040   Age  2 Filed at: 08/04/2020 2040   Risk Factors  2 Filed at: 08/04/2020 2040   Troponin  0 Filed at: 08/04/2020 2040   HEART Score  5 Filed at: 08/04/2020 2040            BARBIE/DAST-10      Most Recent Value   How many times in the past year have you    Used an illegal drug or used a prescription medication for non-medical reasons?   Never Filed at: 08/04/2020 1945                                MDM  Number of Diagnoses or Management Options  Dyspnea: new and requires workup  Diagnosis management comments: DDX including but not limited to: ACS, MI, PE, PTX, pneumonia, dissection, pleurisy, pericarditis, myocarditis, rhabdomyolysis, GI etiology  Plan: cardiac workup  EKg  Dispo pending  Amount and/or Complexity of Data Reviewed  Clinical lab tests: ordered and reviewed  Tests in the radiology section of CPT®: ordered and reviewed  Independent visualization of images, tracings, or specimens: yes    Risk of Complications, Morbidity, and/or Mortality  Presenting problems: moderate  Management options: low  General comments: 66 yo male pt with exertional dyspnea and fatigue  Has new changes on EKG in V2 and V3  Concerning for worsening angina when compared to previous hospitalization  His symptoms are now resolved  Trop negative  DDimer negative based on age adjustment  Normal vitals at this time  Will admit for tele/obs  Pt agrees with plan  Stable at the time of admission  Patient Progress  Patient progress: stable        Disposition  Final diagnoses:   Dyspnea     Time reflects when diagnosis was documented in both MDM as applicable and the Disposition within this note     Time User Action Codes Description Comment    8/4/2020  9:00 PM Jarome Brought Add [R06 00] Dyspnea       ED Disposition     ED Disposition Condition Date/Time Comment    Admit Stable Tue Aug 4, 2020  9:00 PM Case was discussed with Sue Palma and the patient's admission status was agreed to be Admission Status: observation status to the service of Dr Deena Bolden   Follow-up Information    None         Patient's Medications   Discharge Prescriptions    No medications on file     No discharge procedures on file      PDMP Review     None          ED Provider  Electronically Signed by           Erum Poe PA-C  08/04/20 4969

## 2020-08-05 LAB
ABO GROUP BLD: NORMAL
ABO GROUP BLD: NORMAL
ANION GAP SERPL CALCULATED.3IONS-SCNC: 7 MMOL/L (ref 4–13)
ATRIAL RATE: 92 BPM
BASOPHILS # BLD AUTO: 0.05 THOUSANDS/ΜL (ref 0–0.1)
BASOPHILS NFR BLD AUTO: 1 % (ref 0–1)
BLD GP AB SCN SERPL QL: NEGATIVE
BUN SERPL-MCNC: 6 MG/DL (ref 5–25)
CALCIUM SERPL-MCNC: 8.1 MG/DL (ref 8.3–10.1)
CHLORIDE SERPL-SCNC: 105 MMOL/L (ref 100–108)
CO2 SERPL-SCNC: 28 MMOL/L (ref 21–32)
CREAT SERPL-MCNC: 0.76 MG/DL (ref 0.6–1.3)
EOSINOPHIL # BLD AUTO: 0.12 THOUSAND/ΜL (ref 0–0.61)
EOSINOPHIL NFR BLD AUTO: 2 % (ref 0–6)
ERYTHROCYTE [DISTWIDTH] IN BLOOD BY AUTOMATED COUNT: 17.5 % (ref 11.6–15.1)
FERRITIN SERPL-MCNC: 5 NG/ML (ref 8–388)
GFR SERPL CREATININE-BSD FRML MDRD: 89 ML/MIN/1.73SQ M
GLUCOSE P FAST SERPL-MCNC: 113 MG/DL (ref 65–99)
GLUCOSE SERPL-MCNC: 113 MG/DL (ref 65–140)
HCT VFR BLD AUTO: 27 % (ref 36.5–49.3)
HEMOCCULT STL QL: NEGATIVE
HGB BLD-MCNC: 7.2 G/DL (ref 12–17)
IMM GRANULOCYTES # BLD AUTO: 0.01 THOUSAND/UL (ref 0–0.2)
IMM GRANULOCYTES NFR BLD AUTO: 0 % (ref 0–2)
IRON SATN MFR SERPL: 3 %
IRON SERPL-MCNC: 16 UG/DL (ref 65–175)
LYMPHOCYTES # BLD AUTO: 1.93 THOUSANDS/ΜL (ref 0.6–4.47)
LYMPHOCYTES NFR BLD AUTO: 39 % (ref 14–44)
MCH RBC QN AUTO: 19.2 PG (ref 26.8–34.3)
MCHC RBC AUTO-ENTMCNC: 26.7 G/DL (ref 31.4–37.4)
MCV RBC AUTO: 72 FL (ref 82–98)
MONOCYTES # BLD AUTO: 0.92 THOUSAND/ΜL (ref 0.17–1.22)
MONOCYTES NFR BLD AUTO: 19 % (ref 4–12)
NEUTROPHILS # BLD AUTO: 1.9 THOUSANDS/ΜL (ref 1.85–7.62)
NEUTS SEG NFR BLD AUTO: 39 % (ref 43–75)
NRBC BLD AUTO-RTO: 0 /100 WBCS
P AXIS: 23 DEGREES
PLATELET # BLD AUTO: 139 THOUSANDS/UL (ref 149–390)
PMV BLD AUTO: 9.9 FL (ref 8.9–12.7)
POTASSIUM SERPL-SCNC: 3.6 MMOL/L (ref 3.5–5.3)
PR INTERVAL: 134 MS
QRS AXIS: -52 DEGREES
QRSD INTERVAL: 94 MS
QT INTERVAL: 398 MS
QTC INTERVAL: 492 MS
RBC # BLD AUTO: 3.75 MILLION/UL (ref 3.88–5.62)
RH BLD: POSITIVE
RH BLD: POSITIVE
SODIUM SERPL-SCNC: 140 MMOL/L (ref 136–145)
SPECIMEN EXPIRATION DATE: NORMAL
T WAVE AXIS: 101 DEGREES
TIBC SERPL-MCNC: 480 UG/DL (ref 250–450)
TROPONIN I SERPL-MCNC: <0.02 NG/ML
TROPONIN I SERPL-MCNC: <0.02 NG/ML
VENTRICULAR RATE: 92 BPM
WBC # BLD AUTO: 4.93 THOUSAND/UL (ref 4.31–10.16)

## 2020-08-05 PROCEDURE — 82272 OCCULT BLD FECES 1-3 TESTS: CPT | Performed by: PHYSICIAN ASSISTANT

## 2020-08-05 PROCEDURE — 80048 BASIC METABOLIC PNL TOTAL CA: CPT | Performed by: PHYSICIAN ASSISTANT

## 2020-08-05 PROCEDURE — 99232 SBSQ HOSP IP/OBS MODERATE 35: CPT | Performed by: GENERAL PRACTICE

## 2020-08-05 PROCEDURE — 86900 BLOOD TYPING SEROLOGIC ABO: CPT | Performed by: GENERAL PRACTICE

## 2020-08-05 PROCEDURE — 86901 BLOOD TYPING SEROLOGIC RH(D): CPT | Performed by: GENERAL PRACTICE

## 2020-08-05 PROCEDURE — 82728 ASSAY OF FERRITIN: CPT | Performed by: PHYSICIAN ASSISTANT

## 2020-08-05 PROCEDURE — 93010 ELECTROCARDIOGRAM REPORT: CPT | Performed by: INTERNAL MEDICINE

## 2020-08-05 PROCEDURE — 83540 ASSAY OF IRON: CPT | Performed by: PHYSICIAN ASSISTANT

## 2020-08-05 PROCEDURE — 86920 COMPATIBILITY TEST SPIN: CPT

## 2020-08-05 PROCEDURE — 83550 IRON BINDING TEST: CPT | Performed by: PHYSICIAN ASSISTANT

## 2020-08-05 PROCEDURE — 85025 COMPLETE CBC W/AUTO DIFF WBC: CPT | Performed by: PHYSICIAN ASSISTANT

## 2020-08-05 PROCEDURE — 86850 RBC ANTIBODY SCREEN: CPT | Performed by: GENERAL PRACTICE

## 2020-08-05 PROCEDURE — P9016 RBC LEUKOCYTES REDUCED: HCPCS

## 2020-08-05 PROCEDURE — 84484 ASSAY OF TROPONIN QUANT: CPT | Performed by: PHYSICIAN ASSISTANT

## 2020-08-05 RX ADMIN — DOCUSATE SODIUM 100 MG: 100 CAPSULE, LIQUID FILLED ORAL at 09:34

## 2020-08-05 RX ADMIN — MELATONIN 3 MG: at 21:55

## 2020-08-05 RX ADMIN — PANTOPRAZOLE SODIUM 40 MG: 40 TABLET, DELAYED RELEASE ORAL at 04:46

## 2020-08-05 RX ADMIN — METOPROLOL TARTRATE 12.5 MG: 25 TABLET ORAL at 21:55

## 2020-08-05 RX ADMIN — DOCUSATE SODIUM 100 MG: 100 CAPSULE, LIQUID FILLED ORAL at 17:59

## 2020-08-05 RX ADMIN — METOPROLOL TARTRATE 12.5 MG: 25 TABLET ORAL at 09:34

## 2020-08-05 NOTE — ASSESSMENT & PLAN NOTE
· Patient previously reported to the ED last month with chest pain that was thought to be noncardiac in nature, had EGD done showing: Trace esophageal varices in the lower esophagus  Gastric ulcer  Normal duoenum   · Patient reports he stopped taking his Protonix as his prescription ran out, will restart  · Patient denies any blood in his stool  · FOBT pending  · Continue Mylanta p r n

## 2020-08-05 NOTE — ED NOTES
1  CC Shortness of Breath (starting today, denies cough, denies fevers)    2  Is this admission due to an injury? No    3  Orientation status Ox4    4  Abnormal labs/abnormal focused assessment/abnormal vitals Elevated Protime 14 9 and D-dimer  Low Albumin 3 4  Pt BP and HR runs slightly high  5  Medications/ drips None    6  Last time narcotics were given/ pain medication None    7  IV lines/drains/ etc  20 LT Wrist     8  Isolation status Standard Precaution     9  Skin Visually Intact    10  Ambulation status Light assist to - X1    11   ED phone #[phone] Call charge if unable to reach primary nurse              Pedro Luis Lanius, RN  08/04/20 5351

## 2020-08-05 NOTE — UTILIZATION REVIEW
Initial Clinical Review    OBS order 8/4 2101 converted to IP on 8/5 @1741  Admitting Physician  CHRISTI MCKNIGHT     Level of Care  Med Surg     Estimated length of stay  More than 2 Midnights     Certification  I certify that inpatient services are medically necessary for this patient for a duration of greater than two midnights  See H&P and MD Progress Notes for additional information about the patient's course of treatment  ED Arrival Information     Expected Arrival Acuity Means of Arrival Escorted By Service Admission Type    - 8/4/2020 19:41 Urgent Wheelchair Family Member General Medicine Urgent    Arrival Complaint    SOB        Chief Complaint   Patient presents with    Shortness of Breath     starting today, denies cough, denies fevers     Assessment/Plan: 67 y o  male with a history of peptic ulcer disease, anemia requiring blood transfusions who presents with dyspnea on exertion  History is obtained from patient with help of PCA who is Ugandan-speaking  Patient reports he had 2 episodes of dyspnea on exertion since this morning  He reports even small movements such as tying his shoe makes him short of breath  He reports this completely resolves once he stops exerting himself  He denies any chest pain throughout the day, or any palpitations  He also reports feeling very fatigued and generally weak for the last few weeks  He reports about a year ago he had similar feelings and had a blood transfusion done in [place], reports he felt much better after receiving the transfusion  He denies any blood in his stool or urine or any other signs of bleeding  He reports he has been taking his pantoprazole, but his prescription just ran out so he stopped taking it a few days ago  He denies any abdominal pain, nausea vomiting or diarrhea  He does report some shortness of breath when he lays down flat    He also reports he has been having trouble sleeping recently and can not seem to get comfortable at night  He denies snoring at night or waking up short of breath from sleep  Dyspnea on exertion  Assessment & Plan  Patient reports dyspnea on exertion for 2 episodes this morning  Likely related to his anemia  Patient denies any chest pain  Currently denies any symptoms  · Initial troponin negative, trend x3  · EKG showed normal sinus rhythm, ST segment changes in V2 and V3  Will repeat with 2nd troponin  · BNP within normal limits  · ROSE score of 1  · Chest x-ray negative for acute abnormality  · Recent echocardiogram done in July showing EF of > 30%, grade 1 diastolic dysfunction  · See plan under anemia     Peptic ulcer disease  Assessment & Plan  · Patient previously reported to the ED last month with chest pain that was thought to be noncardiac in nature, had EGD done showing: Trace esophageal varices in the lower esophagus  Gastric ulcer   Normal duoenum   · Patient reports he stopped taking his Protonix as his prescription ran out, will restart  · Patient denies any blood in his stool  · FOBT pending  · Continue Mylanta p r n      Anemia  Assessment & Plan  Patient reports dyspnea on exertion, fatigue  · Patient with hemoglobin of 7 3, chronic, recent hemoglobin has been between 7 5 any 8 0  · Patient does report history of erosive duodenitis and gastric ulcer, reports he received a blood transfusion in [facility] 1 year ago  · Monitor for signs of bleeding, fobt pending  · Trend CBC  · Iron panel pending  · Patient may require IV iron or blood transfusion if hemoglobin drops  · No antiplatelets or anticoagulation at this time     Essential hypertension  Assessment & Plan  · Continue metoprolol  · Monitor per unit protocol  · Blood pressures have been stable    VTE Prophylaxis: Pharmacologic VTE Prophylaxis contraindicated due to Anemia  / sequential compression device   Code Status:  Full code  POLST: POLST is not applicable to this patient  Discussion with family:      Anticipated Length of Stay:  Patient will be admitted on an Observation basis with an anticipated length of stay of  < 2 midnights     Justification for Hospital Stay:  Patient with dyspnea on exertion, requiring cardiac workup and workup for anemia     ED Triage Vitals   Temperature Pulse Respirations Blood Pressure SpO2   08/04/20 1950 08/04/20 1950 08/04/20 1950 08/04/20 1950 08/04/20 1950   98 2 °F (36 8 °C) 98 18 (!) 188/85 99 %      Temp Source Heart Rate Source Patient Position - Orthostatic VS BP Location FiO2 (%)   08/04/20 1950 08/04/20 1950 08/04/20 1950 08/04/20 1950 --   Oral Monitor Lying Right arm       Pain Score       08/04/20 2155       No Pain          Wt Readings from Last 1 Encounters:   08/04/20 97 3 kg (214 lb 8 1 oz)     Additional Vital Signs:   08/05/20 0934      90                      08/05/20 0756                  100 %   None (Room air)       08/05/20 07:39:32   97 9 °F (36 6 °C)   85   19   128/68   88   99 %          08/05/20 03:14:26   97 6 °F (36 4 °C)   95   19   127/70   89   97 %          08/04/20 21:56:26   98 °F (36 7 °C)   96   18   148/89   109   98 %   None (Room air)   Lying    08/04/20 2100      110Abnormal     18   162/80   114   94 %   None (Room air)   Lying    08/04/20 2030      96   16   149/74   104   98 %   None (Room          Pertinent Labs/Diagnostic Test Results:     8/4 PCXR No acute cardiopulmonary disease  8/4 EKg   NSR  Results from last 7 days   Lab Units 08/05/20 0443 08/04/20 2002   WBC Thousand/uL 4 93 5 01   HEMOGLOBIN g/dL 7 2* 7 3*   HEMATOCRIT % 27 0* 26 9*   PLATELETS Thousands/uL 139* 137*   NEUTROS ABS Thousands/µL 1 90 1 88     Results from last 7 days   Lab Units 08/05/20 0443 08/04/20 2002   SODIUM mmol/L 140 139   POTASSIUM mmol/L 3 6 3 5   CHLORIDE mmol/L 105 104   CO2 mmol/L 28 26   ANION GAP mmol/L 7 9   BUN mg/dL 6 7   CREATININE mg/dL 0 76 0 88   EGFR ml/min/1 73sq m 89 83   CALCIUM mg/dL 8 1* 8 3   MAGNESIUM mg/dL  --  2 2 Results from last 7 days   Lab Units 08/04/20 2002   AST U/L 29   ALT U/L 44   ALK PHOS U/L 113   TOTAL PROTEIN g/dL 7 8   ALBUMIN g/dL 3 4*   TOTAL BILIRUBIN mg/dL 0 40     Results from last 7 days   Lab Units 08/05/20  0443 08/04/20 2002   GLUCOSE RANDOM mg/dL 113 138     Results from last 7 days   Lab Units 08/05/20  0443 08/04/20  2343 08/04/20 2002   TROPONIN I ng/mL <0 02 <0 02 <0 02     Results from last 7 days   Lab Units 08/04/20 2002   D-DIMER QUANTITATIVE ug/ml FEU 0 59*     Results from last 7 days   Lab Units 08/04/20 2002   PROTIME seconds 14 9*   INR  1 15   PTT seconds 31     Results from last 7 days   Lab Units 08/04/20 2002   NT-PRO BNP pg/mL 27     Results from last 7 days   Lab Units 08/05/20 0443   FERRITIN ng/mL 5*       ED Treatment:   Medication Administration from 08/04/2020 1940 to 08/04/2020 2141     None        Past Medical History:   Diagnosis Date    Hypertension      Present on Admission:   Dyspnea on exertion   Essential hypertension   Peptic ulcer disease   Anemia      Admitting Diagnosis: Dyspnea [R06 00]  SOB (shortness of breath) [R06 02]  Age/Sex: 67 y o  male  Admission Orders:  Scheduled Medications:  docusate sodium, 100 mg, Oral, BID  melatonin, 3 mg, Oral, HS  metoprolol tartrate, 12 5 mg, Oral, Q12H ALYSA  pantoprazole, 40 mg, Oral, Early Morning      Continuous IV Infusions:     PRN Meds:  acetaminophen, 650 mg, Oral, Q4H PRN  aluminum-magnesium hydroxide-simethicone, 30 mL, Oral, Q4H PRN  ondansetron, 4 mg, Intravenous, Q6H PRN      Tele   Up w/ assist    Network Utilization Review Department  [email or web address]  ATTENTION: Please call with any questions or concerns to [phone] and carefully listen to the prompts so that you are directed to the right person   All voicemails are confidential   Cooper Lara all requests for admission clinical reviews, approved or denied determinations and any other requests to dedicated fax number below belonging to the campus where the patient is receiving treatment   List of dedicated fax numbers for the Facilities:  [address] DENIALS (Administrative/Medical Necessity) [phone]   [address] (Maternity/NICU/Pediatrics) [phone]   Mary Handing [phone]   Donny Ford [phone]   Elvi Rouse [phone]   Amberly Liam [phone]   [address] [place] [address] [phone]   [facility]  [phone]   [address] [facility]  [address] [facility] [address] [facility] [phone]

## 2020-08-05 NOTE — PLAN OF CARE
Problem: Potential for Falls  Goal: Patient will remain free of falls  Description: INTERVENTIONS:  - Assess patient frequently for physical needs  -  Identify cognitive and physical deficits and behaviors that affect risk of falls    -  [place] fall precautions as indicated by assessment   - Educate patient/family on patient safety including physical limitations  - Instruct patient to call for assistance with activity based on assessment  - Modify environment to reduce risk of injury  - Consider OT/PT consult to assist with strengthening/mobility  Outcome: Progressing     Problem: PAIN - ADULT  Goal: Verbalizes/displays adequate comfort level or baseline comfort level  Description: Interventions:  - Encourage patient to monitor pain and request assistance  - Assess pain using appropriate pain scale  - Administer analgesics based on type and severity of pain and evaluate response  - Implement non-pharmacological measures as appropriate and evaluate response  - Consider cultural and social influences on pain and pain management  - Notify physician/advanced practitioner if interventions unsuccessful or patient reports new pain  Outcome: Progressing     Problem: INFECTION - ADULT  Goal: Absence or prevention of progression during hospitalization  Description: INTERVENTIONS:  - Assess and monitor for signs and symptoms of infection  - Monitor lab/diagnostic results  - Monitor all insertion sites, i e  indwelling lines, tubes, and drains  - Monitor endotracheal if appropriate and nasal secretions for changes in amount and color  - [place] appropriate cooling/warming therapies per order  - Administer medications as ordered  - Instruct and encourage patient and family to use good hand hygiene technique  - Identify and instruct in appropriate isolation precautions for identified infection/condition  Outcome: Progressing     Problem: SAFETY ADULT  Goal: Patient will remain free of falls  Description: INTERVENTIONS:  - Assess patient frequently for physical needs  -  Identify cognitive and physical deficits and behaviors that affect risk of falls    -  [place] fall precautions as indicated by assessment   - Educate patient/family on patient safety including physical limitations  - Instruct patient to call for assistance with activity based on assessment  - Modify environment to reduce risk of injury  - Consider OT/PT consult to assist with strengthening/mobility  Outcome: Progressing  Goal: Maintain or return to baseline ADL function  Description: INTERVENTIONS:  -  Assess patient's ability to carry out ADLs; assess patient's baseline for ADL function and identify physical deficits which impact ability to perform ADLs (bathing, care of mouth/teeth, toileting, grooming, dressing, etc )  - Assess/evaluate cause of self-care deficits   - Assess range of motion  - Assess patient's mobility; develop plan if impaired  - Assess patient's need for assistive devices and provide as appropriate  - Encourage maximum independence but intervene and supervise when necessary  - Involve family in performance of ADLs  - Assess for home care needs following discharge   - Consider OT consult to assist with ADL evaluation and planning for discharge  - Provide patient education as appropriate  Outcome: Progressing  Goal: Maintain or return mobility status to optimal level  Description: INTERVENTIONS:  - Assess patient's baseline mobility status (ambulation, transfers, stairs, etc )    - Identify cognitive and physical deficits and behaviors that affect mobility  - Identify mobility aids required to assist with transfers and/or ambulation (gait belt, sit-to-stand, lift, walker, cane, etc )  - [place] fall precautions as indicated by assessment  - Record patient progress and toleration of activity level on Mobility SBAR; progress patient to next Phase/Stage  - Instruct patient to call for assistance with activity based on assessment  - Consider rehabilitation consult to assist with strengthening/weightbearing, etc   Outcome: Progressing     Problem: DISCHARGE PLANNING  Goal: Discharge to home or other facility with appropriate resources  Description: INTERVENTIONS:  - Identify barriers to discharge w/patient and caregiver  - Arrange for needed discharge resources and transportation as appropriate  - Identify discharge learning needs (meds, wound care, etc )  - Arrange for interpretive services to assist at discharge as needed  - Refer to Case Management Department for coordinating discharge planning if the patient needs post-hospital services based on physician/advanced practitioner order or complex needs related to functional status, cognitive ability, or social support system  Outcome: Progressing     Problem: Knowledge Deficit  Goal: Patient/family/caregiver demonstrates understanding of disease process, treatment plan, medications, and discharge instructions  Description: Complete learning assessment and assess knowledge base    Interventions:  - Provide teaching at level of understanding  - Provide teaching via preferred learning methods  Outcome: Progressing

## 2020-08-05 NOTE — ASSESSMENT & PLAN NOTE
Patient reports dyspnea on exertion, fatigue  · Patient with hemoglobin of 7 3, chronic, recent hemoglobin has been between 7 5 any 8 0  · Patient does report history of erosive duodenitis and gastric ulcer, reports he received a blood transfusion in [place] 1 year ago which helped with similar symptoms  · Monitor for signs of bleeding, fobt pending  · Trend CBC  · Iron panel with fe deficiency  · hgb 7 2 will transfuse  · GI consult

## 2020-08-05 NOTE — PROGRESS NOTES
Progress Note - Ovi Nelson 1945, 67 y o  male MRN: [id number]    Unit/Bed#: -01 Encounter: [id number]    Primary Care Provider: No primary care provider on file  Date and time admitted to hospital: 8/4/2020  7:43 PM        Peptic ulcer disease  Assessment & Plan  · Patient previously reported to the ED last month with chest pain that was thought to be noncardiac in nature, had EGD done showing: Trace esophageal varices in the lower esophagus  Gastric ulcer  Normal duoenum   · Patient reports he stopped taking his Protonix as his prescription ran out, will restart  · Patient denies any blood in his stool  · FOBT pending  · Continue Mylanta p r n  Anemia  Assessment & Plan  Patient reports dyspnea on exertion, fatigue  · Patient with hemoglobin of 7 3, chronic, recent hemoglobin has been between 7 5 any 8 0  · Patient does report history of erosive duodenitis and gastric ulcer, reports he received a blood transfusion in [facility] 1 year ago which helped with similar symptoms  · Monitor for signs of bleeding, fobt pending  · Trend CBC  · Iron panel with fe deficiency  · hgb 7 2 will transfuse  · GI consult    Essential hypertension  Assessment & Plan  · Continue metoprolol  · Monitor per unit protocol  · Blood pressures have been stable    * Dyspnea on exertion  Assessment & Plan  Patient reports dyspnea on exertion for 2 episodes this morning  Likely related to his anemia  Patient denies any chest pain  Currently denies any symptoms  · Initial troponin negative, trend x3  · EKG showed normal sinus rhythm, ST segment changes in V2 and V3    Will repeat with 2nd troponin  · BNP within normal limits  · ROSE score of 1  · Chest x-ray negative for acute abnormality  · Recent echocardiogram done in July showing EF of > 06%, grade 1 diastolic dysfunction  · See plan under anemia      VTE Pharmacologic Prophylaxis:   Pharmacologic: Pharmacologic VTE Prophylaxis contraindicated due to anemia  Mechanical VTE Prophylaxis in Place: Yes    Patient Centered Rounds: I have performed bedside rounds with nursing staff today  Discussions with Specialists or Other Care Team Provider:     Education and Discussions with Family / Patient:     Time Spent for Care: 30 minutes  More than 50% of total time spent on counseling and coordination of care as described above  Current Length of Stay: 0 day(s)    Current Patient Status: Observation   Certification Statement: The patient will continue to require additional inpatient hospital stay due to anemia    Discharge Plan: home    Code Status: Level 1 - Full Code      Subjective:   C/o shortness of breath exertion-going to bathroom  Denies chest pain but does get back pain at times  Objective:     Vitals:   Temp (24hrs), Av °F (36 7 °C), Min:97 6 °F (36 4 °C), Max:98 3 °F (36 8 °C)    Temp:  [97 6 °F (36 4 °C)-98 3 °F (36 8 °C)] 98 3 °F (36 8 °C)  HR:  [] 81  Resp:  [16-19] 16  BP: ()/(58-89) 129/67  SpO2:  [94 %-100 %] 99 %  Body mass index is 30 78 kg/m²  Input and Output Summary (last 24 hours): Intake/Output Summary (Last 24 hours) at 2020 1739  Last data filed at 2020 1147  Gross per 24 hour   Intake 240 ml   Output 1050 ml   Net -810 ml       Physical Exam:     Physical Exam   Constitutional: He is oriented to person, place, and time  HENT:   Head: Normocephalic  Right Ear: Tympanic membrane normal    Nose: No congestion  Eyes: Pupils are equal, round, and reactive to light  Neck: No neck rigidity  Cardiovascular: Normal rate  An irregular rhythm present  Pulmonary/Chest: Effort normal and breath sounds normal    Abdominal: Soft  Normal appearance  Musculoskeletal:         General: No swelling  Neurological: He is alert and oriented to person, place, and time  Skin: Skin is warm and dry  There is pallor  No jaundice  Vitals reviewed          Additional Data:     Labs:    Results from last 7 days Lab Units 08/05/20 0443   WBC Thousand/uL 4 93   HEMOGLOBIN g/dL 7 2*   HEMATOCRIT % 27 0*   PLATELETS Thousands/uL 139*   NEUTROS PCT % 39*   LYMPHS PCT % 39   MONOS PCT % 19*   EOS PCT % 2     Results from last 7 days   Lab Units 08/05/20 0443 08/04/20 2002   SODIUM mmol/L 140 139   POTASSIUM mmol/L 3 6 3 5   CHLORIDE mmol/L 105 104   CO2 mmol/L 28 26   BUN mg/dL 6 7   CREATININE mg/dL 0 76 0 88   ANION GAP mmol/L 7 9   CALCIUM mg/dL 8 1* 8 3   ALBUMIN g/dL  --  3 4*   TOTAL BILIRUBIN mg/dL  --  0 40   ALK PHOS U/L  --  113   ALT U/L  --  44   AST U/L  --  29   GLUCOSE RANDOM mg/dL 113 138     Results from last 7 days   Lab Units 08/04/20 2002   INR  1 15                       * I Have Reviewed All Lab Data Listed Above  * Additional Pertinent Lab Tests Reviewed: All Labs Within Last 24 Hours Reviewed    Imaging:    Imaging Reports Reviewed Today Include:   Imaging Personally Reviewed by Myself Includes:      Recent Cultures (last 7 days):           Last 24 Hours Medication List:   acetaminophen, 650 mg, Oral, Q4H PRN, Riri Das PA-C  aluminum-magnesium hydroxide-simethicone, 30 mL, Oral, Q4H PRN, Riri Das PA-C  docusate sodium, 100 mg, Oral, BID, Riri Das PA-C  melatonin, 3 mg, Oral, HS, Riri Das PA-C  metoprolol tartrate, 12 5 mg, Oral, Q12H [address], Riri Das PA-C  ondansetron, 4 mg, Intravenous, Q6H PRN, Riri Das PA-C  pantoprazole, 40 mg, Oral, Early Morning, Riri Das PA-C         Today, Patient Was Seen By: Blair Guallpa MD    ** Please Note: Dictation voice to text software may have been used in the creation of this document   **

## 2020-08-05 NOTE — ASSESSMENT & PLAN NOTE
Patient reports dyspnea on exertion for 2 episodes this morning  Likely related to his anemia  Patient denies any chest pain  Currently denies any symptoms  · Initial troponin negative, trend x3  · EKG showed normal sinus rhythm, ST segment changes in V2 and V3    Will repeat with 2nd troponin  · BNP within normal limits  · ROSE score of 1  · Chest x-ray negative for acute abnormality  · Recent echocardiogram done in July showing EF of > 11%, grade 1 diastolic dysfunction  · See plan under anemia

## 2020-08-05 NOTE — SOCIAL WORK
LOS 14 HOURS  RISK OF UNPLANNED READMISSION SCORE N/A  30 DAY READMISSION: NO  BUNDLE: NO    Patient lives with wife in a 1-story home w/ 2 JOEY  Patient is able to complete all ADLs on first level, IPTA  No DME identified  No Hx VNA, STR, MH, or SA  Preferred Pharmacy: [facility], no barriers identified to obtaining Rx  No POA, however decision-making would defer to wife due to PA   Patient does not have a PCP at this time and would prefer scheduling his own PCP  CM provided infolink card and updated AVS to include infolink information  CM reviewed discharge planning process including the following: identifying help at home, patient preference for discharge planning needs, pharmacy preference, and availability of treatment team to discuss questions or concerns patient and/or family may have regarding understanding medications and recognizing signs and symptoms once discharged  CM also encouraged patient to follow up with all recommended appointments after discharge  Patient advised of importance for patient and family to participate in managing patients medical well being  CM name and role reviewed  Discharge Checklist reviewed and CM will continue to monitor for progress toward discharge goals in nursing and provider rounds  Per rounding, patient is on cardiac diet  CXR and troponins were negative  At this time, no CM needs identified  CM available through discharge

## 2020-08-05 NOTE — H&P
H&P- Chris Warner 1945, 67 y o  male MRN: [id number]    Unit/Bed#: -01 Encounter: [id number]    Primary Care Provider: No primary care provider on file  Date and time admitted to hospital: 8/4/2020  7:43 PM        * Dyspnea on exertion  Assessment & Plan  Patient reports dyspnea on exertion for 2 episodes this morning  Likely related to his anemia  Patient denies any chest pain  Currently denies any symptoms  · Initial troponin negative, trend x3  · EKG showed normal sinus rhythm, ST segment changes in V2 and V3  Will repeat with 2nd troponin  · BNP within normal limits  · ROSE score of 1  · Chest x-ray negative for acute abnormality  · Recent echocardiogram done in July showing EF of > 42%, grade 1 diastolic dysfunction  · See plan under anemia    Peptic ulcer disease  Assessment & Plan  · Patient previously reported to the ED last month with chest pain that was thought to be noncardiac in nature, had EGD done showing: Trace esophageal varices in the lower esophagus  Gastric ulcer  Normal duoenum   · Patient reports he stopped taking his Protonix as his prescription ran out, will restart  · Patient denies any blood in his stool  · FOBT pending  · Continue Mylanta p r n      Anemia  Assessment & Plan  Patient reports dyspnea on exertion, fatigue  · Patient with hemoglobin of 7 3, chronic, recent hemoglobin has been between 7 5 any 8 0  · Patient does report history of erosive duodenitis and gastric ulcer, reports he received a blood transfusion in [facility] 1 year ago  · Monitor for signs of bleeding, fobt pending  · Trend CBC  · Iron panel pending  · Patient may require IV iron or blood transfusion if hemoglobin drops  · No antiplatelets or anticoagulation at this time    Essential hypertension  Assessment & Plan  · Continue metoprolol  · Monitor per unit protocol  · Blood pressures have been stable    VTE Prophylaxis: Pharmacologic VTE Prophylaxis contraindicated due to Anemia  / sequential compression device   Code Status:  Full code  POLST: POLST is not applicable to this patient  Discussion with family:     Anticipated Length of Stay:  Patient will be admitted on an Observation basis with an anticipated length of stay of  < 2 midnights  Justification for Hospital Stay:  Patient with dyspnea on exertion, requiring cardiac workup and workup for anemia    Total Time for Visit, including Counseling / Coordination of Care: 1 hour  Greater than 50% of this total time spent on direct patient counseling and coordination of care  Chief Complaint:     Chief Complaint   Patient presents with    Shortness of Breath     starting today, denies cough, denies fevers         History of Present Illness:    Singh Hills is a 67 y o  male with a history of peptic ulcer disease, anemia requiring blood transfusions who presents with dyspnea on exertion  History is obtained from patient with help of PCA who is Danish-speaking  Patient reports he had 2 episodes of dyspnea on exertion since this morning  He reports even small movements such as tying his shoe makes him short of breath  He reports this completely resolves once he stops exerting himself  He denies any chest pain throughout the day, or any palpitations  He also reports feeling very fatigued and generally weak for the last few weeks  He reports about a year ago he had similar feelings and had a blood transfusion done in [place], reports he felt much better after receiving the transfusion  He denies any blood in his stool or urine or any other signs of bleeding  He reports he has been taking his pantoprazole, but his prescription just ran out so he stopped taking it a few days ago  He denies any abdominal pain, nausea vomiting or diarrhea  He does report some shortness of breath when he lays down flat  He also reports he has been having trouble sleeping recently and can not seem to get comfortable at night    He denies snoring at night or waking up short of breath from sleep  Review of Systems:    Review of Systems   Constitutional: Positive for activity change and fatigue  Negative for chills and fever  HENT: Negative for congestion, postnasal drip, sinus pain and sore throat  Eyes: Negative for visual disturbance  Respiratory: Positive for shortness of breath  Negative for cough, chest tightness and wheezing  Cardiovascular: Negative for chest pain, palpitations and leg swelling  Gastrointestinal: Negative for abdominal pain, blood in stool, constipation, diarrhea, nausea and vomiting  Genitourinary: Negative for dysuria, flank pain and hematuria  Musculoskeletal: Negative for back pain and myalgias  Skin: Negative for rash  Neurological: Positive for weakness (Generalized)  Negative for dizziness, light-headedness and headaches  Hematological: Does not bruise/bleed easily  Psychiatric/Behavioral: Negative for behavioral problems  Past Medical and Surgical History:     Past Medical History:   Diagnosis Date    Hypertension        Past Surgical History:   Procedure Laterality Date    EGD         Meds/Allergies:    Prior to Admission medications    Medication Sig Start Date End Date Taking? Authorizing Provider   metoprolol tartrate (LOPRESSOR) 25 mg tablet Take 0 5 tablets (12 5 mg total) by mouth every 12 (twelve) hours 7/20/20 8/19/20 Yes Wendy Tinajero MD   aluminum-magnesium hydroxide-simethicone (MYLANTA) 200-200-20 mg/5 mL suspension Take 30 mL by mouth every 4 (four) hours as needed for indigestion or heartburn 7/18/20   Wendy Tinajero MD   pantoprazole (PROTONIX) 40 mg tablet Take 1 tablet (40 mg total) by mouth daily in the early morning  Patient not taking: Reported on 8/4/2020 7/21/20 8/20/20  Wendy Tinajero MD     I have reviewed home medications with patient personally      Allergies: No Known Allergies    Social History:     Marital Status: /Civil Union   Patient Pre-hospital Living Situation:  Private residence  Patient Pre-hospital Level of Mobility:  Independent  Patient Pre-hospital Diet Restrictions:  None  Substance Use History:   Social History     Substance and Sexual Activity   Alcohol Use Yes    Frequency: 4 or more times a week    Drinks per session: 7 to 9     Social History     Tobacco Use   Smoking Status Never Smoker   Smokeless Tobacco Never Used     Social History     Substance and Sexual Activity   Drug Use Never       Family History:    non-contributory    Physical Exam:     Vitals:   Blood Pressure: 148/89 (08/04/20 2156)  Pulse: 96 (08/04/20 2156)  Temperature: 98 °F (36 7 °C) (08/04/20 2156)  Temp Source: Oral (08/04/20 2156)  Respirations: 18 (08/04/20 2156)  Height: 5' 10" (08/04/20 2156)  Weight - Scale: 97 3 kg (214 lb 8 1 oz) (08/04/20 2156)  SpO2: 98 % (08/04/20 2156)    Physical Exam   Constitutional: He appears well-developed  HENT:   Head: Normocephalic and atraumatic  Eyes: Pupils are equal, round, and reactive to light  Neck: Normal range of motion  Neck supple  Cardiovascular: Normal rate and regular rhythm  No murmur heard  Pulmonary/Chest: Effort normal and breath sounds normal  No respiratory distress  He has no wheezes  He has no rales  He exhibits no tenderness  Abdominal: Soft  Bowel sounds are normal  There is no abdominal tenderness  Musculoskeletal: Normal range of motion  Neurological: He is alert  Skin: Skin is warm and dry  Vitals reviewed  Additional Data:     Lab Results: I have personally reviewed pertinent reports        Results from last 7 days   Lab Units 08/04/20 2002   WBC Thousand/uL 5 01   HEMOGLOBIN g/dL 7 3*   HEMATOCRIT % 26 9*   PLATELETS Thousands/uL 137*   NEUTROS PCT % 38*   LYMPHS PCT % 42   MONOS PCT % 17*   EOS PCT % 2     Results from last 7 days   Lab Units 08/04/20 2002   SODIUM mmol/L 139   POTASSIUM mmol/L 3 5   CHLORIDE mmol/L 104   CO2 mmol/L 26   BUN mg/dL 7   CREATININE mg/dL 0 88   ANION GAP mmol/L 9   CALCIUM mg/dL 8 3   ALBUMIN g/dL 3 4*   TOTAL BILIRUBIN mg/dL 0 40   ALK PHOS U/L 113   ALT U/L 44   AST U/L 29   GLUCOSE RANDOM mg/dL 138     Results from last 7 days   Lab Units 08/04/20 2002   INR  1 15                   Imaging: I have personally reviewed pertinent reports  XR chest portable   Final Result by Alberto Robbins DO (08/04 2049)      No acute cardiopulmonary disease  Workstation performed: [id number]             EKG, Pathology, and Other Studies Reviewed on Admission:   · EKG:  Normal sinus rhythm, incomplete right bundle-branch block, similar to previous  Nonspecific ST segment changes    Allscripts / Epic Records Reviewed: Yes     ** Please Note: This note has been constructed using a voice recognition system   **

## 2020-08-05 NOTE — PLAN OF CARE
Problem: Potential for Falls  Goal: Patient will remain free of falls  Description: INTERVENTIONS:  - Assess patient frequently for physical needs  -  Identify cognitive and physical deficits and behaviors that affect risk of falls    -  [place] fall precautions as indicated by assessment   - Educate patient/family on patient safety including physical limitations  - Instruct patient to call for assistance with activity based on assessment  - Modify environment to reduce risk of injury  - Consider OT/PT consult to assist with strengthening/mobility  Outcome: Not Progressing     Problem: PAIN - ADULT  Goal: Verbalizes/displays adequate comfort level or baseline comfort level  Description: Interventions:  - Encourage patient to monitor pain and request assistance  - Assess pain using appropriate pain scale  - Administer analgesics based on type and severity of pain and evaluate response  - Implement non-pharmacological measures as appropriate and evaluate response  - Consider cultural and social influences on pain and pain management  - Notify physician/advanced practitioner if interventions unsuccessful or patient reports new pain  Outcome: Not Progressing     Problem: INFECTION - ADULT  Goal: Absence or prevention of progression during hospitalization  Description: INTERVENTIONS:  - Assess and monitor for signs and symptoms of infection  - Monitor lab/diagnostic results  - Monitor all insertion sites, i e  indwelling lines, tubes, and drains  - Monitor endotracheal if appropriate and nasal secretions for changes in amount and color  - [place] appropriate cooling/warming therapies per order  - Administer medications as ordered  - Instruct and encourage patient and family to use good hand hygiene technique  - Identify and instruct in appropriate isolation precautions for identified infection/condition  Outcome: Not Progressing     Problem: SAFETY ADULT  Goal: Patient will remain free of falls  Description: INTERVENTIONS:  - Assess patient frequently for physical needs  -  Identify cognitive and physical deficits and behaviors that affect risk of falls    -  [place] fall precautions as indicated by assessment   - Educate patient/family on patient safety including physical limitations  - Instruct patient to call for assistance with activity based on assessment  - Modify environment to reduce risk of injury  - Consider OT/PT consult to assist with strengthening/mobility  Outcome: Not Progressing  Goal: Maintain or return to baseline ADL function  Description: INTERVENTIONS:  -  Assess patient's ability to carry out ADLs; assess patient's baseline for ADL function and identify physical deficits which impact ability to perform ADLs (bathing, care of mouth/teeth, toileting, grooming, dressing, etc )  - Assess/evaluate cause of self-care deficits   - Assess range of motion  - Assess patient's mobility; develop plan if impaired  - Assess patient's need for assistive devices and provide as appropriate  - Encourage maximum independence but intervene and supervise when necessary  - Involve family in performance of ADLs  - Assess for home care needs following discharge   - Consider OT consult to assist with ADL evaluation and planning for discharge  - Provide patient education as appropriate  Outcome: Not Progressing  Goal: Maintain or return mobility status to optimal level  Description: INTERVENTIONS:  - Assess patient's baseline mobility status (ambulation, transfers, stairs, etc )    - Identify cognitive and physical deficits and behaviors that affect mobility  - Identify mobility aids required to assist with transfers and/or ambulation (gait belt, sit-to-stand, lift, walker, cane, etc )  - [place] fall precautions as indicated by assessment  - Record patient progress and toleration of activity level on Mobility SBAR; progress patient to next Phase/Stage  - Instruct patient to call for assistance with activity based on assessment  - Consider rehabilitation consult to assist with strengthening/weightbearing, etc   Outcome: Not Progressing     Problem: DISCHARGE PLANNING  Goal: Discharge to home or other facility with appropriate resources  Description: INTERVENTIONS:  - Identify barriers to discharge w/patient and caregiver  - Arrange for needed discharge resources and transportation as appropriate  - Identify discharge learning needs (meds, wound care, etc )  - Arrange for interpretive services to assist at discharge as needed  - Refer to Case Management Department for coordinating discharge planning if the patient needs post-hospital services based on physician/advanced practitioner order or complex needs related to functional status, cognitive ability, or social support system  Outcome: Not Progressing     Problem: Knowledge Deficit  Goal: Patient/family/caregiver demonstrates understanding of disease process, treatment plan, medications, and discharge instructions  Description: Complete learning assessment and assess knowledge base    Interventions:  - Provide teaching at level of understanding  - Provide teaching via preferred learning methods  Outcome: Not Progressing

## 2020-08-05 NOTE — ASSESSMENT & PLAN NOTE
Patient reports dyspnea on exertion for 2 episodes this morning  Likely related to his anemia  Patient denies any chest pain  Currently denies any symptoms  · Initial troponin negative, trend x3  · EKG showed normal sinus rhythm, ST segment changes in V2 and V3    Will repeat with 2nd troponin  · BNP within normal limits  · ROSE score of 1  · Chest x-ray negative for acute abnormality  · Recent echocardiogram done in July showing EF of > 40%, grade 1 diastolic dysfunction  · See plan under anemia

## 2020-08-06 VITALS
TEMPERATURE: 97.5 F | BODY MASS INDEX: 30.71 KG/M2 | RESPIRATION RATE: 18 BRPM | WEIGHT: 214.51 LBS | HEART RATE: 83 BPM | OXYGEN SATURATION: 97 % | HEIGHT: 70 IN | DIASTOLIC BLOOD PRESSURE: 74 MMHG | SYSTOLIC BLOOD PRESSURE: 133 MMHG

## 2020-08-06 PROBLEM — R06.09 DYSPNEA ON EXERTION: Status: RESOLVED | Noted: 2020-07-18 | Resolved: 2020-08-06

## 2020-08-06 PROBLEM — R06.00 DYSPNEA ON EXERTION: Status: RESOLVED | Noted: 2020-07-18 | Resolved: 2020-08-06

## 2020-08-06 LAB
ABO GROUP BLD BPU: NORMAL
AFP-TM SERPL-MCNC: 4.8 NG/ML (ref 0.5–8)
ANION GAP SERPL CALCULATED.3IONS-SCNC: 5 MMOL/L (ref 4–13)
ATRIAL RATE: 80 BPM
BASOPHILS # BLD AUTO: 0.04 THOUSANDS/ΜL (ref 0–0.1)
BASOPHILS NFR BLD AUTO: 1 % (ref 0–1)
BPU ID: NORMAL
BUN SERPL-MCNC: 9 MG/DL (ref 5–25)
CALCIUM SERPL-MCNC: 8.2 MG/DL (ref 8.3–10.1)
CHLORIDE SERPL-SCNC: 107 MMOL/L (ref 100–108)
CO2 SERPL-SCNC: 28 MMOL/L (ref 21–32)
CREAT SERPL-MCNC: 0.84 MG/DL (ref 0.6–1.3)
CROSSMATCH: NORMAL
EOSINOPHIL # BLD AUTO: 0.11 THOUSAND/ΜL (ref 0–0.61)
EOSINOPHIL NFR BLD AUTO: 2 % (ref 0–6)
ERYTHROCYTE [DISTWIDTH] IN BLOOD BY AUTOMATED COUNT: 18.6 % (ref 11.6–15.1)
GFR SERPL CREATININE-BSD FRML MDRD: 85 ML/MIN/1.73SQ M
GLUCOSE SERPL-MCNC: 117 MG/DL (ref 65–140)
HCT VFR BLD AUTO: 28.9 % (ref 36.5–49.3)
HGB BLD-MCNC: 8.1 G/DL (ref 12–17)
IMM GRANULOCYTES # BLD AUTO: 0.02 THOUSAND/UL (ref 0–0.2)
IMM GRANULOCYTES NFR BLD AUTO: 0 % (ref 0–2)
IRON SATN MFR SERPL: 8 %
IRON SERPL-MCNC: 37 UG/DL (ref 65–175)
LYMPHOCYTES # BLD AUTO: 1.84 THOUSANDS/ΜL (ref 0.6–4.47)
LYMPHOCYTES NFR BLD AUTO: 33 % (ref 14–44)
MCH RBC QN AUTO: 20.4 PG (ref 26.8–34.3)
MCHC RBC AUTO-ENTMCNC: 28 G/DL (ref 31.4–37.4)
MCV RBC AUTO: 73 FL (ref 82–98)
MONOCYTES # BLD AUTO: 0.79 THOUSAND/ΜL (ref 0.17–1.22)
MONOCYTES NFR BLD AUTO: 14 % (ref 4–12)
NEUTROPHILS # BLD AUTO: 2.83 THOUSANDS/ΜL (ref 1.85–7.62)
NEUTS SEG NFR BLD AUTO: 50 % (ref 43–75)
NRBC BLD AUTO-RTO: 0 /100 WBCS
P AXIS: 41 DEGREES
PLATELET # BLD AUTO: 135 THOUSANDS/UL (ref 149–390)
PMV BLD AUTO: 9.5 FL (ref 8.9–12.7)
POTASSIUM SERPL-SCNC: 3.9 MMOL/L (ref 3.5–5.3)
PR INTERVAL: 142 MS
QRS AXIS: -53 DEGREES
QRSD INTERVAL: 96 MS
QT INTERVAL: 402 MS
QTC INTERVAL: 463 MS
RBC # BLD AUTO: 3.97 MILLION/UL (ref 3.88–5.62)
SODIUM SERPL-SCNC: 140 MMOL/L (ref 136–145)
T WAVE AXIS: 109 DEGREES
TIBC SERPL-MCNC: 488 UG/DL (ref 250–450)
UNIT DISPENSE STATUS: NORMAL
UNIT PRODUCT CODE: NORMAL
UNIT RH: NORMAL
VENTRICULAR RATE: 80 BPM
WBC # BLD AUTO: 5.63 THOUSAND/UL (ref 4.31–10.16)

## 2020-08-06 PROCEDURE — 84450 TRANSFERASE (AST) (SGOT): CPT | Performed by: PHYSICIAN ASSISTANT

## 2020-08-06 PROCEDURE — 93010 ELECTROCARDIOGRAM REPORT: CPT | Performed by: INTERNAL MEDICINE

## 2020-08-06 PROCEDURE — 99239 HOSP IP/OBS DSCHRG MGMT >30: CPT | Performed by: GENERAL PRACTICE

## 2020-08-06 PROCEDURE — 80048 BASIC METABOLIC PNL TOTAL CA: CPT | Performed by: GENERAL PRACTICE

## 2020-08-06 PROCEDURE — 82465 ASSAY BLD/SERUM CHOLESTEROL: CPT | Performed by: PHYSICIAN ASSISTANT

## 2020-08-06 PROCEDURE — 86235 NUCLEAR ANTIGEN ANTIBODY: CPT | Performed by: PHYSICIAN ASSISTANT

## 2020-08-06 PROCEDURE — 83883 ASSAY NEPHELOMETRY NOT SPEC: CPT | Performed by: PHYSICIAN ASSISTANT

## 2020-08-06 PROCEDURE — 99222 1ST HOSP IP/OBS MODERATE 55: CPT | Performed by: INTERNAL MEDICINE

## 2020-08-06 PROCEDURE — 82947 ASSAY GLUCOSE BLOOD QUANT: CPT | Performed by: PHYSICIAN ASSISTANT

## 2020-08-06 PROCEDURE — 83540 ASSAY OF IRON: CPT | Performed by: PHYSICIAN ASSISTANT

## 2020-08-06 PROCEDURE — 82247 BILIRUBIN TOTAL: CPT | Performed by: PHYSICIAN ASSISTANT

## 2020-08-06 PROCEDURE — 85025 COMPLETE CBC W/AUTO DIFF WBC: CPT | Performed by: GENERAL PRACTICE

## 2020-08-06 PROCEDURE — 84460 ALANINE AMINO (ALT) (SGPT): CPT | Performed by: PHYSICIAN ASSISTANT

## 2020-08-06 PROCEDURE — 93005 ELECTROCARDIOGRAM TRACING: CPT

## 2020-08-06 PROCEDURE — 82977 ASSAY OF GGT: CPT | Performed by: PHYSICIAN ASSISTANT

## 2020-08-06 PROCEDURE — 82172 ASSAY OF APOLIPOPROTEIN: CPT | Performed by: PHYSICIAN ASSISTANT

## 2020-08-06 PROCEDURE — 80074 ACUTE HEPATITIS PANEL: CPT | Performed by: PHYSICIAN ASSISTANT

## 2020-08-06 PROCEDURE — 82105 ALPHA-FETOPROTEIN SERUM: CPT | Performed by: PHYSICIAN ASSISTANT

## 2020-08-06 PROCEDURE — 84478 ASSAY OF TRIGLYCERIDES: CPT | Performed by: PHYSICIAN ASSISTANT

## 2020-08-06 PROCEDURE — 86256 FLUORESCENT ANTIBODY TITER: CPT | Performed by: PHYSICIAN ASSISTANT

## 2020-08-06 PROCEDURE — 83010 ASSAY OF HAPTOGLOBIN QUANT: CPT | Performed by: PHYSICIAN ASSISTANT

## 2020-08-06 PROCEDURE — 86038 ANTINUCLEAR ANTIBODIES: CPT | Performed by: PHYSICIAN ASSISTANT

## 2020-08-06 PROCEDURE — 83550 IRON BINDING TEST: CPT | Performed by: PHYSICIAN ASSISTANT

## 2020-08-06 RX ORDER — PANTOPRAZOLE SODIUM 40 MG/1
40 TABLET, DELAYED RELEASE ORAL
Status: DISCONTINUED | OUTPATIENT
Start: 2020-08-06 | End: 2020-08-06 | Stop reason: HOSPADM

## 2020-08-06 RX ORDER — SACCHAROMYCES BOULARDII 250 MG
250 CAPSULE ORAL 2 TIMES DAILY
Qty: 60 CAPSULE | Refills: 0 | Status: SHIPPED | OUTPATIENT
Start: 2020-08-06

## 2020-08-06 RX ORDER — FERROUS SULFATE TAB EC 324 MG (65 MG FE EQUIVALENT) 324 (65 FE) MG
324 TABLET DELAYED RESPONSE ORAL
Qty: 60 TABLET | Refills: 0 | Status: SHIPPED | OUTPATIENT
Start: 2020-08-06

## 2020-08-06 RX ORDER — FOLIC ACID 1 MG/1
1 TABLET ORAL DAILY
Qty: 30 TABLET | Refills: 0 | Status: SHIPPED | OUTPATIENT
Start: 2020-08-06

## 2020-08-06 RX ORDER — CLARITHROMYCIN 250 MG/1
500 TABLET, FILM COATED ORAL EVERY 12 HOURS SCHEDULED
Status: DISCONTINUED | OUTPATIENT
Start: 2020-08-06 | End: 2020-08-06 | Stop reason: HOSPADM

## 2020-08-06 RX ORDER — CLARITHROMYCIN 500 MG/1
500 TABLET, COATED ORAL EVERY 12 HOURS SCHEDULED
Qty: 28 TABLET | Refills: 0 | Status: SHIPPED | OUTPATIENT
Start: 2020-08-06 | End: 2020-08-20

## 2020-08-06 RX ORDER — AMOXICILLIN 500 MG/1
1000 CAPSULE ORAL EVERY 12 HOURS SCHEDULED
Qty: 56 CAPSULE | Refills: 0 | Status: SHIPPED | OUTPATIENT
Start: 2020-08-06 | End: 2020-08-13

## 2020-08-06 RX ORDER — AMOXICILLIN 250 MG/1
1000 CAPSULE ORAL EVERY 12 HOURS SCHEDULED
Status: DISCONTINUED | OUTPATIENT
Start: 2020-08-06 | End: 2020-08-06 | Stop reason: HOSPADM

## 2020-08-06 RX ADMIN — DOCUSATE SODIUM 100 MG: 100 CAPSULE, LIQUID FILLED ORAL at 08:46

## 2020-08-06 RX ADMIN — AMOXICILLIN 1000 MG: 250 CAPSULE ORAL at 13:49

## 2020-08-06 RX ADMIN — METOPROLOL TARTRATE 12.5 MG: 25 TABLET ORAL at 08:46

## 2020-08-06 RX ADMIN — CLARITHROMYCIN 500 MG: 250 TABLET, FILM COATED ORAL at 13:49

## 2020-08-06 RX ADMIN — PANTOPRAZOLE SODIUM 40 MG: 40 TABLET, DELAYED RELEASE ORAL at 05:29

## 2020-08-06 NOTE — ASSESSMENT & PLAN NOTE
Patient reports dyspnea on exertion, fatigue  · Patient with hemoglobin of 7 3, chronic, recent hemoglobin has been between 7 5 any 8 0  · Patient does report history of erosive duodenitis and gastric ulcer, reports he received a blood transfusion in [place] 1 year ago which helped with similar symptoms  · Monitor for signs of bleeding, fobt negative  · Trend CBC  · Iron panel with fe deficiency  · hgb 7 2 transfused 1u prbc 8/5/20 -hgb 8 3  · GI consult appreciated will complete outpatient work up and start rx of h pylori  · Will also arrange outpatient heme consult

## 2020-08-06 NOTE — CONSULTS
Consultation - [facility]  Fran Cifuentes 67 y o  male MRN: [id number]  Unit/Bed#: -01 Encounter: [id number]      Consults "Click on Consult Button"     Reason for Consult / Principal Problem:  History of peptic ulcer disease    HPI: Fran Cifuentes is a 67y o  year old male who presents with a past medical history significant for peptic ulcer disease, essential hypertension, H pylori gastritis, and cirrhosis  Patient presents the emergency room department yesterday with a chief complaint of shortness of breath  Patient's hemoglobin in the emergency department was 7 2  Patient did receive 1 unit of packed red blood cells and his hemoglobin this morning is 8 1  Patient has known peptic ulcer disease  Initially his peptic ulcer was diagnosed while he was living in [facility]   Most recently he was admitted here to [facility] 2 weeks ago with a chief complaint of chest pain and anemia  Patient underwent an EGD on July 20, 2020 which did show multiple small and minimal varices with no bleeding in the lower 3rd of the esophagus, a single small crater drowned ulcer in the antrum, and normal duodenum  Pathology was positive for H pylori  Patient has not been started on treatment for H pylori as of yet because he has not had his follow-up appointment that was scheduled with GI  He is scheduled to see Fredy Capsp PA-C on August 10, 2020  Due to the fact the patient did have small varices on endoscopy he did undergo a CT of the abdomen and pelvis prior to discharge during his last admission that did show hepatic cirrhosis and portal hypertension with small esophageal varices  There was no evidence of HCC  There was no evidence of ascites at that time  Patient was then discharged home  Full liver workup has not been completed as of yet  REVIEW OF SYSTEMS:     CONSTITUTIONAL: Denies any fever, chills, or rigors  Good appetite, and no recent weight loss    HEENT: No earache or tinnitus  Denies hearing loss or visual disturbances  CARDIOVASCULAR: No chest pain or palpitations  RESPIRATORY: Denies any cough, hemoptysis, shortness of breath or dyspnea on exertion  GASTROINTESTINAL: As noted in the History of Present Illness  GENITOURINARY: No problems with urination  Denies any hematuria or dysuria  NEUROLOGIC: No dizziness or vertigo, denies headaches  MUSCULOSKELETAL: Denies any muscle or joint pain  SKIN: Denies skin rashes or itching  ENDOCRINE: Denies excessive thirst  Denies intolerance to heat or cold  PSYCHOSOCIAL: Denies depression or anxiety  Denies any recent memory loss  Historical Information   Past Medical History:   Diagnosis Date    Hypertension      Past Surgical History:   Procedure Laterality Date    EGD       Social History   Social History     Substance and Sexual Activity   Alcohol Use Yes    Frequency: 4 or more times a week    Drinks per session: 7 to 9     Social History     Substance and Sexual Activity   Drug Use Never     Social History     Tobacco Use   Smoking Status Never Smoker   Smokeless Tobacco Never Used     History reviewed  No pertinent family history      Meds/Allergies     Medications Prior to Admission   Medication    metoprolol tartrate (LOPRESSOR) 25 mg tablet    aluminum-magnesium hydroxide-simethicone (MYLANTA) 200-200-20 mg/5 mL suspension    pantoprazole (PROTONIX) 40 mg tablet     Current Facility-Administered Medications   Medication Dose Route Frequency    acetaminophen (TYLENOL) tablet 650 mg  650 mg Oral Q4H PRN    aluminum-magnesium hydroxide-simethicone (MYLANTA) 200-200-20 mg/5 mL oral suspension 30 mL  30 mL Oral Q4H PRN    docusate sodium (COLACE) capsule 100 mg  100 mg Oral BID    melatonin tablet 3 mg  3 mg Oral HS    metoprolol tartrate (LOPRESSOR) partial tablet 12 5 mg  12 5 mg Oral Q12H [facility]    ondansetron (ZOFRAN) injection 4 mg  4 mg Intravenous Q6H PRN    pantoprazole (PROTONIX) EC tablet 40 mg  40 mg Oral BID AC       No Known Allergies    Objective   Blood pressure 120/79, pulse 78, temperature 98 1 °F (36 7 °C), resp  rate 18, height 5' 10" (1 778 m), weight 97 3 kg (214 lb 8 1 oz), SpO2 97 %  Intake/Output Summary (Last 24 hours) at 8/6/2020 1047  Last data filed at 8/6/2020 0436  Gross per 24 hour   Intake 1070 ml   Output 650 ml   Net 420 ml         PHYSICAL EXAM:      General Appearance:   Alert, cooperative, no distress, appears stated age    HEENT:   Normocephalic, atraumatic, anicteric      Neck:  Supple, symmetrical, trachea midline, no adenopathy;    thyroid: no enlargement/tenderness/nodules; no carotid  bruit or JVD    Lungs:   Clear to auscultation bilaterally; no rales, rhonchi or wheezing; respirations unlabored    Heart[de-identified]   S1 and S2 normal; regular rate and rhythm; no murmur, rub, or gallop     Abdomen:   Soft, non-tender, non-distended; normal bowel sounds; no masses, no organomegaly    Genitalia:   Deferred    Rectal:   Deferred    Extremities:  No cyanosis, clubbing or edema    Pulses:  2+ and symmetric all extremities    Skin:  Skin color, texture, turgor normal, no rashes or lesions    Lymph nodes:  No palpable cervical, axillary or inguinal lymphadenopathy        Lab Results:   Admission on 08/04/2020   Component Date Value    WBC 08/04/2020 5 01     RBC 08/04/2020 3 80*    Hemoglobin 08/04/2020 7 3*    Hematocrit 08/04/2020 26 9*    MCV 08/04/2020 71*    MCH 08/04/2020 19 2*    MCHC 08/04/2020 27 1*    RDW 08/04/2020 17 5*    MPV 08/04/2020 9 1     Platelets 03/70/4980 137*    nRBC 08/04/2020 0     Neutrophils Relative 08/04/2020 38*    Immat GRANS % 08/04/2020 0     Lymphocytes Relative 08/04/2020 42     Monocytes Relative 08/04/2020 17*    Eosinophils Relative 08/04/2020 2     Basophils Relative 08/04/2020 1     Neutrophils Absolute 08/04/2020 1 88     Immature Grans Absolute 08/04/2020 0 02     Lymphocytes Absolute 08/04/2020 2 12     Monocytes Absolute 08/04/2020 0 83     Eosinophils Absolute 08/04/2020 0 12     Basophils Absolute 08/04/2020 0 04     Protime 08/04/2020 14 9*    INR 08/04/2020 1 15     PTT 08/04/2020 31     Sodium 08/04/2020 139     Potassium 08/04/2020 3 5     Chloride 08/04/2020 104     CO2 08/04/2020 26     ANION GAP 08/04/2020 9     BUN 08/04/2020 7     Creatinine 08/04/2020 0 88     Glucose 08/04/2020 138     Calcium 08/04/2020 8 3     AST 08/04/2020 29     ALT 08/04/2020 44     Alkaline Phosphatase 08/04/2020 113     Total Protein 08/04/2020 7 8     Albumin 08/04/2020 3 4*    Total Bilirubin 08/04/2020 0 40     eGFR 08/04/2020 83     D-Dimer, Quant 08/04/2020 0 59*    Troponin I 08/04/2020 <0 02     NT-proBNP 08/04/2020 27     Magnesium 08/04/2020 2 2     Troponin I 08/04/2020 <0 02     Ventricular Rate 08/04/2020 95     Atrial Rate 08/04/2020 95     DE Interval 08/04/2020 134     QRSD Interval 08/04/2020 106     QT Interval 08/04/2020 376     QTC Interval 08/04/2020 472     P Axis 08/04/2020 46     QRS Axis 08/04/2020 -41     T Wave Axis 08/04/2020 100     Fecal Occult Blood Diagn* 08/05/2020 Negative     Troponin I 08/05/2020 <0 02     WBC 08/05/2020 4 93     RBC 08/05/2020 3 75*    Hemoglobin 08/05/2020 7 2*    Hematocrit 08/05/2020 27 0*    MCV 08/05/2020 72*    MCH 08/05/2020 19 2*    MCHC 08/05/2020 26 7*    RDW 08/05/2020 17 5*    MPV 08/05/2020 9 9     Platelets 20/34/2949 139*    nRBC 08/05/2020 0     Neutrophils Relative 08/05/2020 39*    Immat GRANS % 08/05/2020 0     Lymphocytes Relative 08/05/2020 39     Monocytes Relative 08/05/2020 19*    Eosinophils Relative 08/05/2020 2     Basophils Relative 08/05/2020 1     Neutrophils Absolute 08/05/2020 1 90     Immature Grans Absolute 08/05/2020 0 01     Lymphocytes Absolute 08/05/2020 1 93     Monocytes Absolute 08/05/2020 0 92     Eosinophils Absolute 08/05/2020 0 12     Basophils Absolute 08/05/2020 0 05  Sodium 08/05/2020 140     Potassium 08/05/2020 3 6     Chloride 08/05/2020 105     CO2 08/05/2020 28     ANION GAP 08/05/2020 7     BUN 08/05/2020 6     Creatinine 08/05/2020 0 76     Glucose 08/05/2020 113     Glucose, Fasting 08/05/2020 113*    Calcium 08/05/2020 8 1*    eGFR 08/05/2020 89     Iron Saturation 08/05/2020 3     TIBC 08/05/2020 480*    Iron 08/05/2020 16*    Ferritin 08/05/2020 5*    ABO Grouping 08/05/2020 B     Rh Factor 08/05/2020 Positive     Antibody Screen 08/05/2020 Negative     Specimen Expiration Date 08/05/2020 78536589     Unit Product Code 08/06/2020 T3088G49     Unit Number 08/06/2020 [id number]     Unit ABO 08/06/2020 B     Unit RH 08/06/2020 POS     Crossmatch 08/06/2020 Compatible     Unit Dispense Status 08/06/2020 Presumed Trans     ABO Grouping 08/05/2020 B     Rh Factor 08/05/2020 Positive     Ventricular Rate 08/04/2020 92     Atrial Rate 08/04/2020 92     OH Interval 08/04/2020 134     QRSD Interval 08/04/2020 94     QT Interval 08/04/2020 398     QTC Interval 08/04/2020 492     P Axis 08/04/2020 23     QRS [employer] 08/04/2020 -52     T Wave Axis 08/04/2020 101     WBC 08/06/2020 5 63     RBC 08/06/2020 3 97     Hemoglobin 08/06/2020 8 1*    Hematocrit 08/06/2020 28 9*    MCV 08/06/2020 73*    MCH 08/06/2020 20 4*    MCHC 08/06/2020 28 0*    RDW 08/06/2020 18 6*    MPV 08/06/2020 9 5     Platelets 41/69/9384 135*    nRBC 08/06/2020 0     Neutrophils Relative 08/06/2020 50     Immat GRANS % 08/06/2020 0     Lymphocytes Relative 08/06/2020 33     Monocytes Relative 08/06/2020 14*    Eosinophils Relative 08/06/2020 2     Basophils Relative 08/06/2020 1     Neutrophils Absolute 08/06/2020 2 83     Immature Grans Absolute 08/06/2020 0 02     Lymphocytes Absolute 08/06/2020 1 84     Monocytes Absolute 08/06/2020 0 79     Eosinophils Absolute 08/06/2020 0 11     Basophils Absolute 08/06/2020 0 04     Sodium 08/06/2020 140  Potassium 08/06/2020 3 9     Chloride 08/06/2020 107     CO2 08/06/2020 28     ANION GAP 08/06/2020 5     BUN 08/06/2020 9     Creatinine 08/06/2020 0 84     Glucose 08/06/2020 117     Calcium 08/06/2020 8 2*    eGFR 08/06/2020 85        Imaging Studies: I have personally reviewed pertinent imaging studies  ASSESSMENT & PLAN:  Acute blood loss anemia  Peptic ulcer disease; pathology positive for H pylori  -Patient has not yet been started on treatment for H pylori  He has a follow-up appointment scheduled for August 10th  Would recommend patient starting on amoxicillin 1000 mg b i d  As well as clarithromycin 500 mg b i d   -Will continue to monitor CBC  -Patient will continue to monitor color of his stool   -Will recommend patient be maintained on Protonix 40 mg twice a day  -Non-ulerogenic diet  Newly diagnosed cirrhosis  -Meld 8, child's class A  -Patient's EGD from July 20th some presently showed small varices  CT scan then confirmed hepatic cirrhosis and portal hypertension  Patient again has not had complete cirrhosis workup at present   -Will order GINA, anti smooth muscle antibody, hepatitis panel, antimitochondrial antibody, and GUEVARA fibrosure  Ascites:  None on imaging from last admission  Patient should be maintained on a less than 2 g salt restricted diet  Hepatic encephalopathy:  None on examination  HCC:  CT scan shows no evidence of liver lesions    Will order alpha fetoprotein  Varices:  Small varices noted on EGD from July 20th  Transplant:  Not addressed at present    Patient will be seen and examined by Dr Viridiana Mayfield PA-C  8/6/2020,10:47 AM

## 2020-08-06 NOTE — DISCHARGE SUMMARY
Discharge- [place] 1945, 67 y o  male MRN: [id number]    Unit/Bed#: -01 Encounter: [id number]    Primary Care Provider: No primary care provider on file  Date and time admitted to hospital: 8/4/2020  7:43 PM        Peptic ulcer disease  Assessment & Plan  · Patient previously reported to the ED last month with chest pain that was thought to be noncardiac in nature, had EGD done showing: Trace esophageal varices in the lower esophagus  Gastric ulcer  Normal duoenum   · Patient reports he stopped taking his Protonix as his prescription ran out, will restart  · Patient denies any blood in his stool but stools have appeared dark  · Also with ho heavy etoh abuse x5 years-8 shots rum daily and beer quit 1 month ago  · FOBT negative  · Continue Mylanta p r n  · Start biaxin and amoxil for h pylori    Anemia  Assessment & Plan  Patient reports dyspnea on exertion, fatigue  · Patient with hemoglobin of 7 3, chronic, recent hemoglobin has been between 7 5 any 8 0  · Patient does report history of erosive duodenitis and gastric ulcer, reports he received a blood transfusion in [facility] 1 year ago which helped with similar symptoms  · Monitor for signs of bleeding, fobt negative  · Trend CBC  · Iron panel with fe deficiency  · hgb 7 2 transfused 1u prbc 8/5/20 -hgb 8 3  · GI consult appreciated will complete outpatient work up and start rx of h pylori  · Will also arrange outpatient heme consult    Essential hypertension  Assessment & Plan  · Continue metoprolol  · Monitor per unit protocol  · Blood pressures have been stable    * Dyspnea on exertion  Assessment & Plan  Patient reports dyspnea on exertion for 2 episodes this morning  Likely related to his anemia  Patient denies any chest pain  Currently denies any symptoms  · Initial troponin negative, trend x3  · EKG showed normal sinus rhythm, ST segment changes in V2 and V3    Will repeat with 2nd troponin  · BNP within normal limits  · ROSE score of 1  · Chest x-ray negative for acute abnormality  · Recent echocardiogram done in July showing EF of > 39%, grade 1 diastolic dysfunction  · sxs improved/resolved with transfusion  Will also arrange outpatient cardiology consult for ett-case d/w cardiology            Discharging Physician / Practitioner: Rowan Cooper MD  PCP: No primary care provider on file  Admission Date:   Admission Orders (From admission, onward)     Ordered        08/05/20 1741  Inpatient Admission  Once         08/04/20 2101  Place in Observation  Once                   Discharge Date: 08/06/20    Resolved Problems  Date Reviewed: 8/6/2020    None          Consultations During Hospital Stay:  · GI    Procedures Performed:   ·     Significant Findings / Test Results:   · Anemia-symptoms resolved with     Incidental Findings:   ·     Test Results Pending at Discharge (will require follow up):   ·      Outpatient Tests Requested:  ·     Complications:      Reason for Admission:     Hospital Course:     Sung Sutton is a 67 y o  male patient who originally presented to the hospital on 8/4/2020 due to dyspnea        Please see above list of diagnoses and related plan for additional information  Condition at Discharge: stable     Discharge Day Visit / Exam:     Subjective:  No c/o-denies chest pain or shortness of breath  Vitals: Blood Pressure: 123/56 (08/06/20 1119)  Pulse: 79 (08/06/20 1119)  Temperature: 98 4 °F (36 9 °C) (08/06/20 1119)  Temp Source: Oral (08/05/20 2145)  Respirations: 18 (08/06/20 1119)  Height: 5' 10" (177 8 cm) (08/04/20 2156)  Weight - Scale: 97 3 kg (214 lb 8 1 oz) (08/04/20 2156)  SpO2: 98 % (08/06/20 1119)  Exam:   Physical Exam    Discussion with Family:     Discharge instructions/Information to patient and family:   See after visit summary for information provided to patient and family        Provisions for Follow-Up Care:  See after visit summary for information related to follow-up care and any pertinent home health orders  Disposition:     Home    For Discharges to [address] [facility]:   · Not Applicable to this Patient - Not Applicable to this Patient    Planned Readmission:      Discharge Statement:  I spent 40 minutes discharging the patient  This time was spent on the day of discharge  I had direct contact with the patient on the day of discharge  Greater than 50% of the total time was spent examining patient, answering all patient questions, arranging and discussing plan of care with patient as well as directly providing post-discharge instructions  Additional time then spent on discharge activities  Discharge Medications:  See after visit summary for reconciled discharge medications provided to patient and family        ** Please Note: This note has been constructed using a voice recognition system **

## 2020-08-06 NOTE — PLAN OF CARE
Problem: Potential for Falls  Goal: Patient will remain free of falls  Description: INTERVENTIONS:  - Assess patient frequently for physical needs  -  Identify cognitive and physical deficits and behaviors that affect risk of falls    -  [place] fall precautions as indicated by assessment   - Educate patient/family on patient safety including physical limitations  - Instruct patient to call for assistance with activity based on assessment  - Modify environment to reduce risk of injury  - Consider OT/PT consult to assist with strengthening/mobility  Outcome: Progressing     Problem: PAIN - ADULT  Goal: Verbalizes/displays adequate comfort level or baseline comfort level  Description: Interventions:  - Encourage patient to monitor pain and request assistance  - Assess pain using appropriate pain scale  - Administer analgesics based on type and severity of pain and evaluate response  - Implement non-pharmacological measures as appropriate and evaluate response  - Consider cultural and social influences on pain and pain management  - Notify physician/advanced practitioner if interventions unsuccessful or patient reports new pain  Outcome: Progressing     Problem: INFECTION - ADULT  Goal: Absence or prevention of progression during hospitalization  Description: INTERVENTIONS:  - Assess and monitor for signs and symptoms of infection  - Monitor lab/diagnostic results  - Monitor all insertion sites, i e  indwelling lines, tubes, and drains  - Monitor endotracheal if appropriate and nasal secretions for changes in amount and color  - [place] appropriate cooling/warming therapies per order  - Administer medications as ordered  - Instruct and encourage patient and family to use good hand hygiene technique  - Identify and instruct in appropriate isolation precautions for identified infection/condition  Outcome: Progressing     Problem: SAFETY ADULT  Goal: Patient will remain free of falls  Description: INTERVENTIONS:  - Assess patient frequently for physical needs  -  Identify cognitive and physical deficits and behaviors that affect risk of falls    -  [place] fall precautions as indicated by assessment   - Educate patient/family on patient safety including physical limitations  - Instruct patient to call for assistance with activity based on assessment  - Modify environment to reduce risk of injury  - Consider OT/PT consult to assist with strengthening/mobility  Outcome: Progressing  Goal: Maintain or return to baseline ADL function  Description: INTERVENTIONS:  -  Assess patient's ability to carry out ADLs; assess patient's baseline for ADL function and identify physical deficits which impact ability to perform ADLs (bathing, care of mouth/teeth, toileting, grooming, dressing, etc )  - Assess/evaluate cause of self-care deficits   - Assess range of motion  - Assess patient's mobility; develop plan if impaired  - Assess patient's need for assistive devices and provide as appropriate  - Encourage maximum independence but intervene and supervise when necessary  - Involve family in performance of ADLs  - Assess for home care needs following discharge   - Consider OT consult to assist with ADL evaluation and planning for discharge  - Provide patient education as appropriate  Outcome: Progressing  Goal: Maintain or return mobility status to optimal level  Description: INTERVENTIONS:  - Assess patient's baseline mobility status (ambulation, transfers, stairs, etc )    - Identify cognitive and physical deficits and behaviors that affect mobility  - Identify mobility aids required to assist with transfers and/or ambulation (gait belt, sit-to-stand, lift, walker, cane, etc )  - [place] fall precautions as indicated by assessment  - Record patient progress and toleration of activity level on Mobility SBAR; progress patient to next Phase/Stage  - Instruct patient to call for assistance with activity based on assessment  - Consider rehabilitation consult to assist with strengthening/weightbearing, etc   Outcome: Progressing     Problem: DISCHARGE PLANNING  Goal: Discharge to home or other facility with appropriate resources  Description: INTERVENTIONS:  - Identify barriers to discharge w/patient and caregiver  - Arrange for needed discharge resources and transportation as appropriate  - Identify discharge learning needs (meds, wound care, etc )  - Arrange for interpretive services to assist at discharge as needed  - Refer to Case Management Department for coordinating discharge planning if the patient needs post-hospital services based on physician/advanced practitioner order or complex needs related to functional status, cognitive ability, or social support system  Outcome: Progressing     Problem: Knowledge Deficit  Goal: Patient/family/caregiver demonstrates understanding of disease process, treatment plan, medications, and discharge instructions  Description: Complete learning assessment and assess knowledge base    Interventions:  - Provide teaching at level of understanding  - Provide teaching via preferred learning methods  Outcome: Progressing

## 2020-08-06 NOTE — PLAN OF CARE
Problem: Potential for Falls  Goal: Patient will remain free of falls  Description: INTERVENTIONS:  - Assess patient frequently for physical needs  -  Identify cognitive and physical deficits and behaviors that affect risk of falls    -  [place] fall precautions as indicated by assessment   - Educate patient/family on patient safety including physical limitations  - Instruct patient to call for assistance with activity based on assessment  - Modify environment to reduce risk of injury  - Consider OT/PT consult to assist with strengthening/mobility  Outcome: Progressing     Problem: PAIN - ADULT  Goal: Verbalizes/displays adequate comfort level or baseline comfort level  Description: Interventions:  - Encourage patient to monitor pain and request assistance  - Assess pain using appropriate pain scale  - Administer analgesics based on type and severity of pain and evaluate response  - Implement non-pharmacological measures as appropriate and evaluate response  - Consider cultural and social influences on pain and pain management  - Notify physician/advanced practitioner if interventions unsuccessful or patient reports new pain  Outcome: Progressing     Problem: INFECTION - ADULT  Goal: Absence or prevention of progression during hospitalization  Description: INTERVENTIONS:  - Assess and monitor for signs and symptoms of infection  - Monitor lab/diagnostic results  - Monitor all insertion sites, i e  indwelling lines, tubes, and drains  - Monitor endotracheal if appropriate and nasal secretions for changes in amount and color  - [place] appropriate cooling/warming therapies per order  - Administer medications as ordered  - Instruct and encourage patient and family to use good hand hygiene technique  - Identify and instruct in appropriate isolation precautions for identified infection/condition  Outcome: Progressing     Problem: SAFETY ADULT  Goal: Patient will remain free of falls  Description: INTERVENTIONS:  - Assess patient frequently for physical needs  -  Identify cognitive and physical deficits and behaviors that affect risk of falls    -  [place] fall precautions as indicated by assessment   - Educate patient/family on patient safety including physical limitations  - Instruct patient to call for assistance with activity based on assessment  - Modify environment to reduce risk of injury  - Consider OT/PT consult to assist with strengthening/mobility  Outcome: Progressing  Goal: Maintain or return to baseline ADL function  Description: INTERVENTIONS:  -  Assess patient's ability to carry out ADLs; assess patient's baseline for ADL function and identify physical deficits which impact ability to perform ADLs (bathing, care of mouth/teeth, toileting, grooming, dressing, etc )  - Assess/evaluate cause of self-care deficits   - Assess range of motion  - Assess patient's mobility; develop plan if impaired  - Assess patient's need for assistive devices and provide as appropriate  - Encourage maximum independence but intervene and supervise when necessary  - Involve family in performance of ADLs  - Assess for home care needs following discharge   - Consider OT consult to assist with ADL evaluation and planning for discharge  - Provide patient education as appropriate  Outcome: Progressing  Goal: Maintain or return mobility status to optimal level  Description: INTERVENTIONS:  - Assess patient's baseline mobility status (ambulation, transfers, stairs, etc )    - Identify cognitive and physical deficits and behaviors that affect mobility  - Identify mobility aids required to assist with transfers and/or ambulation (gait belt, sit-to-stand, lift, walker, cane, etc )  - [place] fall precautions as indicated by assessment  - Record patient progress and toleration of activity level on Mobility SBAR; progress patient to next Phase/Stage  - Instruct patient to call for assistance with activity based on assessment  - Consider rehabilitation consult to assist with strengthening/weightbearing, etc   Outcome: Progressing     Problem: DISCHARGE PLANNING  Goal: Discharge to home or other facility with appropriate resources  Description: INTERVENTIONS:  - Identify barriers to discharge w/patient and caregiver  - Arrange for needed discharge resources and transportation as appropriate  - Identify discharge learning needs (meds, wound care, etc )  - Arrange for interpretive services to assist at discharge as needed  - Refer to Case Management Department for coordinating discharge planning if the patient needs post-hospital services based on physician/advanced practitioner order or complex needs related to functional status, cognitive ability, or social support system  Outcome: Progressing     Problem: Knowledge Deficit  Goal: Patient/family/caregiver demonstrates understanding of disease process, treatment plan, medications, and discharge instructions  Description: Complete learning assessment and assess knowledge base    Interventions:  - Provide teaching at level of understanding  - Provide teaching via preferred learning methods  Outcome: Progressing

## 2020-08-06 NOTE — ASSESSMENT & PLAN NOTE
· Patient previously reported to the ED last month with chest pain that was thought to be noncardiac in nature, had EGD done showing: Trace esophageal varices in the lower esophagus  Gastric ulcer  Normal duoenum   · Patient reports he stopped taking his Protonix as his prescription ran out, will restart  · Patient denies any blood in his stool but stools have appeared dark  · Also with ho heavy etoh abuse x5 years-8 shots rum daily and beer quit 1 month ago  · FOBT negative  · Continue Mylanta p r n    · Start biaxin and amoxil for h pylori

## 2020-08-06 NOTE — ASSESSMENT & PLAN NOTE
Patient reports dyspnea on exertion for 2 episodes this morning  Likely related to his anemia  Patient denies any chest pain  Currently denies any symptoms  · Initial troponin negative, trend x3  · EKG showed normal sinus rhythm, ST segment changes in V2 and V3    Will repeat with 2nd troponin  · BNP within normal limits  · ROSE score of 1  · Chest x-ray negative for acute abnormality  · Recent echocardiogram done in July showing EF of > 71%, grade 1 diastolic dysfunction  · sxs improved/resolved with transfusion  Will also arrange outpatient cardiology consult for ett-case d/w cardiology

## 2020-08-06 NOTE — PLAN OF CARE
Problem: Potential for Falls  Goal: Patient will remain free of falls  Description: INTERVENTIONS:  - Assess patient frequently for physical needs  -  Identify cognitive and physical deficits and behaviors that affect risk of falls    -  [place] fall precautions as indicated by assessment   - Educate patient/family on patient safety including physical limitations  - Instruct patient to call for assistance with activity based on assessment  - Modify environment to reduce risk of injury  - Consider OT/PT consult to assist with strengthening/mobility  8/6/2020 1629 by Jake Abbasi RN  Outcome: Adequate for Discharge  8/6/2020 1104 by Jake Abbasi RN  Outcome: Progressing     Problem: PAIN - ADULT  Goal: Verbalizes/displays adequate comfort level or baseline comfort level  Description: Interventions:  - Encourage patient to monitor pain and request assistance  - Assess pain using appropriate pain scale  - Administer analgesics based on type and severity of pain and evaluate response  - Implement non-pharmacological measures as appropriate and evaluate response  - Consider cultural and social influences on pain and pain management  - Notify physician/advanced practitioner if interventions unsuccessful or patient reports new pain  8/6/2020 1629 by Jake Abbasi RN  Outcome: Adequate for Discharge  8/6/2020 1104 by Jake Abbasi RN  Outcome: Progressing     Problem: INFECTION - ADULT  Goal: Absence or prevention of progression during hospitalization  Description: INTERVENTIONS:  - Assess and monitor for signs and symptoms of infection  - Monitor lab/diagnostic results  - Monitor all insertion sites, i e  indwelling lines, tubes, and drains  - Monitor endotracheal if appropriate and nasal secretions for changes in amount and color  - [place] appropriate cooling/warming therapies per order  - Administer medications as ordered  - Instruct and encourage patient and family to use good hand hygiene technique  - Identify and instruct in appropriate isolation precautions for identified infection/condition  8/6/2020 1629 by Isabella Barragan RN  Outcome: Adequate for Discharge  8/6/2020 1104 by Isabella Barragan RN  Outcome: Progressing     Problem: SAFETY ADULT  Goal: Patient will remain free of falls  Description: INTERVENTIONS:  - Assess patient frequently for physical needs  -  Identify cognitive and physical deficits and behaviors that affect risk of falls    -  [place] fall precautions as indicated by assessment   - Educate patient/family on patient safety including physical limitations  - Instruct patient to call for assistance with activity based on assessment  - Modify environment to reduce risk of injury  - Consider OT/PT consult to assist with strengthening/mobility  8/6/2020 1629 by Isabella Barragan RN  Outcome: Adequate for Discharge  8/6/2020 1104 by Isabella Barragan RN  Outcome: Progressing  Goal: Maintain or return to baseline ADL function  Description: INTERVENTIONS:  -  Assess patient's ability to carry out ADLs; assess patient's baseline for ADL function and identify physical deficits which impact ability to perform ADLs (bathing, care of mouth/teeth, toileting, grooming, dressing, etc )  - Assess/evaluate cause of self-care deficits   - Assess range of motion  - Assess patient's mobility; develop plan if impaired  - Assess patient's need for assistive devices and provide as appropriate  - Encourage maximum independence but intervene and supervise when necessary  - Involve family in performance of ADLs  - Assess for home care needs following discharge   - Consider OT consult to assist with ADL evaluation and planning for discharge  - Provide patient education as appropriate  8/6/2020 1629 by Isabella Barragan RN  Outcome: Adequate for Discharge  8/6/2020 1104 by Isabella Barragan RN  Outcome: Progressing  Goal: Maintain or return mobility status to optimal level  Description: INTERVENTIONS:  - Assess patient's baseline mobility status (ambulation, transfers, stairs, etc )    - Identify cognitive and physical deficits and behaviors that affect mobility  - Identify mobility aids required to assist with transfers and/or ambulation (gait belt, sit-to-stand, lift, walker, cane, etc )  - [place] fall precautions as indicated by assessment  - Record patient progress and toleration of activity level on Mobility SBAR; progress patient to next Phase/Stage  - Instruct patient to call for assistance with activity based on assessment  - Consider rehabilitation consult to assist with strengthening/weightbearing, etc   8/6/2020 1629 by Rick Mooney RN  Outcome: Adequate for Discharge  8/6/2020 1104 by Rick Mooney RN  Outcome: Progressing     Problem: DISCHARGE PLANNING  Goal: Discharge to home or other facility with appropriate resources  Description: INTERVENTIONS:  - Identify barriers to discharge w/patient and caregiver  - Arrange for needed discharge resources and transportation as appropriate  - Identify discharge learning needs (meds, wound care, etc )  - Arrange for interpretive services to assist at discharge as needed  - Refer to Case Management Department for coordinating discharge planning if the patient needs post-hospital services based on physician/advanced practitioner order or complex needs related to functional status, cognitive ability, or social support system  8/6/2020 1629 by Rick Mooney RN  Outcome: Adequate for Discharge  8/6/2020 1104 by Rick Mooney RN  Outcome: Progressing     Problem: Knowledge Deficit  Goal: Patient/family/caregiver demonstrates understanding of disease process, treatment plan, medications, and discharge instructions  Description: Complete learning assessment and assess knowledge base    Interventions:  - Provide teaching at level of understanding  - Provide teaching via preferred learning methods  8/6/2020 1629 by Rick Mooney RN  Outcome: Adequate for Discharge  8/6/2020 1104 by Mary Castaneda, RN  Outcome: Progressing

## 2020-08-07 LAB
ACTIN IGG SERPL-ACNC: 10 UNITS (ref 0–19)
HAV IGM SER QL: NORMAL
HBV CORE IGM SER QL: NORMAL
HBV SURFACE AG SER QL: NORMAL
HCV AB SER QL: NORMAL
MITOCHONDRIA M2 IGG SER-ACNC: <20 UNITS (ref 0–20)
RYE IGE QN: NEGATIVE

## 2020-08-10 ENCOUNTER — OFFICE VISIT (OUTPATIENT)
Dept: GASTROENTEROLOGY | Facility: CLINIC | Age: 75
End: 2020-08-10
Payer: MEDICARE

## 2020-08-10 ENCOUNTER — TELEPHONE (OUTPATIENT)
Dept: GASTROENTEROLOGY | Facility: CLINIC | Age: 75
End: 2020-08-10

## 2020-08-10 VITALS
DIASTOLIC BLOOD PRESSURE: 80 MMHG | BODY MASS INDEX: 31.07 KG/M2 | HEART RATE: 88 BPM | HEIGHT: 70 IN | TEMPERATURE: 98.2 F | WEIGHT: 217 LBS | SYSTOLIC BLOOD PRESSURE: 160 MMHG

## 2020-08-10 DIAGNOSIS — K27.9 PEPTIC ULCER DISEASE: ICD-10-CM

## 2020-08-10 DIAGNOSIS — D50.9 IRON DEFICIENCY ANEMIA, UNSPECIFIED IRON DEFICIENCY ANEMIA TYPE: ICD-10-CM

## 2020-08-10 DIAGNOSIS — D50.8 OTHER IRON DEFICIENCY ANEMIA: Primary | ICD-10-CM

## 2020-08-10 DIAGNOSIS — A04.8 H. PYLORI INFECTION: ICD-10-CM

## 2020-08-10 DIAGNOSIS — K70.30 ALCOHOLIC CIRRHOSIS OF LIVER WITHOUT ASCITES (HCC): ICD-10-CM

## 2020-08-10 PROCEDURE — 99214 OFFICE O/P EST MOD 30 MIN: CPT | Performed by: PHYSICIAN ASSISTANT

## 2020-08-10 RX ORDER — NAPROXEN 500 MG/1
500 TABLET ORAL EVERY 12 HOURS PRN
COMMUNITY
Start: 2020-02-06 | End: 2021-02-05

## 2020-08-10 RX ORDER — PANTOPRAZOLE SODIUM 40 MG/1
40 TABLET, DELAYED RELEASE ORAL
Qty: 30 TABLET | Refills: 2 | Status: SHIPPED | OUTPATIENT
Start: 2020-08-10 | End: 2020-11-07 | Stop reason: SDUPTHER

## 2020-08-10 NOTE — H&P (VIEW-ONLY)
[facility] - Outpatient Follow-up Note  Snehal Cabrera 67 y o  male MRN: [id number]  Encounter: [id number]          ASSESSMENT AND PLAN:      1  Iron deficiency anemia  2  H  pylori infection  3  Peptic ulcer disease    Patient presents for follow up from the hospital:  He had 2 recent admissions related to his anemia  He was found to have a significant iron deficiency anemia with HGB of 7 2 that was microcytic  He was transfused 1 unit of PRBCs previously  Iron levels are low  HGB on discharge was 8 1  EGD 7/19 showed minimal small varices with no bleeding and a small, cratered gastric ulcer - bx were positive for h pylori and he is currently on treatment with Amox/Clarith/PPI  Continue PPI, Amoxicillin, and Clarithromycin course  Continue iron supplementation  Repeat EGD in 3 months for ulcer healing  Repeat stool h pylori in 2 months for VERN  Recheck CBC in a few weeks to ensure improvement  Will also plan for colonoscopy to investigate his iron deficiency anemia (he has never had one)  Also, plan for VCE after as well  4  Alcoholic cirrhosis of liver without ascites (HCC)    CT A/P with contrast while hospitalised showed evidence of cirrhosis (this is a new diagnosis)  Suspect etiology secondary to ETOH given his history of ETOH abuse until about a month ago +/- GUEVARA  He is Child Class A, MELD score of 8  Testing for viral hepatitis, autoimmune liver disease, and hemochromatosis was negative  Varices: Small varices noted on EGD  Ascites: None; low sodium diet recommended  HE: None  [address]  Screen: CT A/P and AFP negative  Recommend continued complete ETOH cessation  Discussed importance of follow up for imaging twice a year and monitoring of labs  Also, information given to patient regarding establishing with a PCP through [facility] (patient recently moved here from [facility])      Follow up in the office in 3 months   ______________________________________________________________________    SUBJECTIVE:  Patient is a 69-year-old male who presents to the office for follow-up from his hospitalizations  Patient has had 2 recent hospitalizations in relation to his iron deficiency anemia  Patient was noted to have a significant iron deficiency anemia that was symptomatic with a hemoglobin of 7 2  He received a blood transfusion of 1 unit packed red blood cells  He also underwent an endoscopy which showed evidence of small varices which were not bleeding and a small gastric ulcer  Biopsies were positive for H pylori and he was put on treatment with amoxicillin, clarithromycin, and a PPI  Patient also has a prior history of a gastric ulcer and anemia requiring transfusion when he was in [facility] as well over a year ago  He denies ever having a colonoscopy  He denies any abdominal pain  He denies any nausea or vomiting  He denies any rectal bleeding  He does note that since he has been on the iron his stools have been darker  During his hospitalization he was also diagnosed with cirrhosis  Patient does report a substantial history of alcohol abuse for many years  He reports drinking over 7 beers a day  He states he stopped drinking a month ago  Liver workup for other etiologies such as viral hepatitis, autoimmune liver disease, and hemochromatosis were negative  This CT scan was also negative for [address]  and negative for ascites  His hemoglobin on discharge was 8 1  Patient is in need of establishing with a primary care physician here  REVIEW OF SYSTEMS IS OTHERWISE NEGATIVE        Historical Information   Past Medical History:   Diagnosis Date    Cirrhosis ([address] )     H pylori ulcer     Hypertension     PUD (peptic ulcer disease)      Past Surgical History:   Procedure Laterality Date    EGD       Social History   Social History     Substance and Sexual Activity   Alcohol Use Not Currently    Frequency: 4 or more times a week    Drinks per session: 7 to 9     Social History     Substance and Sexual Activity   Drug Use Never     Social History     Tobacco Use   Smoking Status Never Smoker   Smokeless Tobacco Never Used     History reviewed  No pertinent family history  Meds/Allergies       Current Outpatient Medications:     aluminum-magnesium hydroxide-simethicone (MYLANTA) 200-200-20 mg/5 mL suspension    amoxicillin (AMOXIL) 500 mg capsule    clarithromycin (BIAXIN) 500 mg tablet    ferrous sulfate 324 (65 Fe) mg    folic acid (FOLVITE) 1 mg tablet    metoprolol tartrate (LOPRESSOR) 25 mg tablet    naproxen (NAPROSYN) 500 mg tablet    pantoprazole (PROTONIX) 40 mg tablet    saccharomyces boulardii (FLORASTOR) 250 mg capsule    No Known Allergies        Objective     Blood pressure 160/80, pulse 88, temperature 98 2 °F (36 8 °C), height 5' 10" (1 778 m), weight 98 4 kg (217 lb)  Body mass index is 31 14 kg/m²  PHYSICAL EXAM:      General Appearance:   Alert, cooperative, no distress   HEENT:   Normocephalic, atraumatic, anicteric    Neck:  Supple, symmetrical, trachea midline   Lungs:   Clear to auscultation bilaterally; no rales, rhonchi or wheezing; respirations unlabored    Heart[de-identified]   Regular rate and rhythm; no murmur, rub, or gallop  Abdomen:   Soft, non-tender, non-distended; normal bowel sounds; no masses, no organomegaly    Genitalia:   Deferred    Rectal:   Deferred    Extremities:  No cyanosis, clubbing or edema    Pulses:  2+ and symmetric    Skin:  No jaundice, rashes, or lesions    Lymph nodes:  No palpable cervical lymphadenopathy        Lab Results:   No visits with results within 1 Day(s) from this visit     Latest known visit with results is:   Admission on 08/04/2020, Discharged on 08/06/2020   Component Date Value    WBC 08/04/2020 5 01     RBC 08/04/2020 3 80*    Hemoglobin 08/04/2020 7 3*    Hematocrit 08/04/2020 26 9*    MCV 08/04/2020 71*    MCH 08/04/2020 19 2*    MCHC 08/04/2020 27 1*    RDW 08/04/2020 17 5*    MPV 08/04/2020 9 1     Platelets 14/51/5909 137*    nRBC 08/04/2020 0     Neutrophils Relative 08/04/2020 38*    Immat GRANS % 08/04/2020 0     Lymphocytes Relative 08/04/2020 42     Monocytes Relative 08/04/2020 17*    Eosinophils Relative 08/04/2020 2     Basophils Relative 08/04/2020 1     Neutrophils Absolute 08/04/2020 1 88     Immature Grans Absolute 08/04/2020 0 02     Lymphocytes Absolute 08/04/2020 2 12     Monocytes Absolute 08/04/2020 0 83     Eosinophils Absolute 08/04/2020 0 12     Basophils Absolute 08/04/2020 0 04     Protime 08/04/2020 14 9*    INR 08/04/2020 1 15     PTT 08/04/2020 31     Sodium 08/04/2020 139     Potassium 08/04/2020 3 5     Chloride 08/04/2020 104     CO2 08/04/2020 26     ANION GAP 08/04/2020 9     BUN 08/04/2020 7     Creatinine 08/04/2020 0 88     Glucose 08/04/2020 138     Calcium 08/04/2020 8 3     AST 08/04/2020 29     ALT 08/04/2020 44     Alkaline Phosphatase 08/04/2020 113     Total Protein 08/04/2020 7 8     Albumin 08/04/2020 3 4*    Total Bilirubin 08/04/2020 0 40     eGFR 08/04/2020 83     D-Dimer, Quant 08/04/2020 0 59*    Troponin I 08/04/2020 <0 02     NT-proBNP 08/04/2020 27     Magnesium 08/04/2020 2 2     Troponin I 08/04/2020 <0 02     Ventricular Rate 08/04/2020 95     Atrial Rate 08/04/2020 95     GA Interval 08/04/2020 134     QRSD Interval 08/04/2020 106     QT Interval 08/04/2020 376     QTC Interval 08/04/2020 472     P Axis 08/04/2020 46     QRS Axis 08/04/2020 -41     T Wave Axis 08/04/2020 100     Fecal Occult Blood Diagn* 08/05/2020 Negative     Troponin I 08/05/2020 <0 02     WBC 08/05/2020 4 93     RBC 08/05/2020 3 75*    Hemoglobin 08/05/2020 7 2*    Hematocrit 08/05/2020 27 0*    MCV 08/05/2020 72*    MCH 08/05/2020 19 2*    MCHC 08/05/2020 26 7*    RDW 08/05/2020 17 5*    MPV 08/05/2020 9 9     Platelets 20/45/1503 139*    nRBC 08/05/2020 0     Neutrophils Relative 08/05/2020 39*    Immat GRANS % 08/05/2020 0     Lymphocytes Relative 08/05/2020 39     Monocytes Relative 08/05/2020 19*    Eosinophils Relative 08/05/2020 2     Basophils Relative 08/05/2020 1     Neutrophils Absolute 08/05/2020 1 90     Immature Grans Absolute 08/05/2020 0 01     Lymphocytes Absolute 08/05/2020 1 93     Monocytes Absolute 08/05/2020 0 92     Eosinophils Absolute 08/05/2020 0 12     Basophils Absolute 08/05/2020 0 05     Sodium 08/05/2020 140     Potassium 08/05/2020 3 6     Chloride 08/05/2020 105     CO2 08/05/2020 28     ANION GAP 08/05/2020 7     BUN 08/05/2020 6     Creatinine 08/05/2020 0 76     Glucose 08/05/2020 113     Glucose, Fasting 08/05/2020 113*    Calcium 08/05/2020 8 1*    eGFR 08/05/2020 89     Iron Saturation 08/05/2020 3     TIBC 08/05/2020 480*    Iron 08/05/2020 16*    Ferritin 08/05/2020 5*    ABO Grouping 08/05/2020 B     Rh Factor 08/05/2020 Positive     Antibody Screen 08/05/2020 Negative     Specimen Expiration Date 08/05/2020 91588231     Unit Product Code 08/06/2020 R9922O45     Unit Number 08/06/2020 [id number]     Unit ABO 08/06/2020 B     Unit RH 08/06/2020 POS     Crossmatch 08/06/2020 Compatible     Unit Dispense Status 08/06/2020 Presumed Trans     ABO Grouping 08/05/2020 B     Rh Factor 08/05/2020 Positive     Ventricular Rate 08/04/2020 92     Atrial Rate 08/04/2020 92     FL Interval 08/04/2020 134     QRSD Interval 08/04/2020 94     QT Interval 08/04/2020 398     QTC Interval 08/04/2020 492     P Axis 08/04/2020 23     QRS [place] 08/04/2020 -52     T Wave Axis 08/04/2020 101     WBC 08/06/2020 5 63     RBC 08/06/2020 3 97     Hemoglobin 08/06/2020 8 1*    Hematocrit 08/06/2020 28 9*    MCV 08/06/2020 73*    MCH 08/06/2020 20 4*    MCHC 08/06/2020 28 0*    RDW 08/06/2020 18 6*    MPV 08/06/2020 9 5     Platelets 14/02/6889 135*    nRBC 08/06/2020 0     Neutrophils Relative 08/06/2020 50     Immat GRANS % 08/06/2020 0     Lymphocytes Relative 08/06/2020 33     Monocytes Relative 08/06/2020 14*    Eosinophils Relative 08/06/2020 2     Basophils Relative 08/06/2020 1     Neutrophils Absolute 08/06/2020 2 83     Immature Grans Absolute 08/06/2020 0 02     Lymphocytes Absolute 08/06/2020 1 84     Monocytes Absolute 08/06/2020 0 79     Eosinophils Absolute 08/06/2020 0 11     Basophils Absolute 08/06/2020 0 04     Sodium 08/06/2020 140     Potassium 08/06/2020 3 9     Chloride 08/06/2020 107     CO2 08/06/2020 28     ANION GAP 08/06/2020 5     BUN 08/06/2020 9     Creatinine 08/06/2020 0 84     Glucose 08/06/2020 117     Calcium 08/06/2020 8 2*    eGFR 08/06/2020 85     AFP TUMOR MARKER 08/06/2020 4 8     GINA 08/06/2020 Negative     Smooth Muscle Ab 08/06/2020 10     Mitochondrial Ab 08/06/2020 <20 0     Hepatitis B Surface Ag 08/06/2020 Non-reactive     Hep A IgM 08/06/2020 Non-reactive     Hepatitis C Ab 08/06/2020 Non-reactive     Hep B C IgM 08/06/2020 Non-reactive     Iron Saturation 08/06/2020 8     TIBC 08/06/2020 488*    Iron 08/06/2020 37*    Ventricular Rate 08/06/2020 80     Atrial Rate 08/06/2020 80     TX Interval 08/06/2020 142     QRSD Interval 08/06/2020 96     QT Interval 08/06/2020 402     QTC Interval 08/06/2020 463     P Axis 08/06/2020 41     QRS Axis 08/06/2020 -48     T Wave Axis 08/06/2020 109          Radiology Results:   Xr Chest Portable    Result Date: 8/4/2020  Narrative: CHEST INDICATION:   SOB  COMPARISON:  July 18, 2020 EXAM PERFORMED/VIEWS:  XR CHEST PORTABLE FINDINGS: Cardiomediastinal silhouette appears unremarkable  The lungs are clear  No pneumothorax or pleural effusion  Osseous structures appear within normal limits for patient age  Impression: No acute cardiopulmonary disease   Workstation performed: [id number]     Xr Chest 1 View Portable    Result Date: 7/18/2020  Narrative: CHEST INDICATION:  Chest pain  COMPARISON:  None EXAM PERFORMED/VIEWS:  XR CHEST PORTABLE FINDINGS:  Monitoring leads and clips project over the chest  Cardiomediastinal silhouette appears unremarkable  The lungs are clear  No pneumothorax or pleural effusion  Paravertebral ossifications thoracic spine  Impression: No acute cardiopulmonary disease  Workstation performed: [id number]     Ct Abdomen W Contrast    Result Date: 7/20/2020  Narrative: CT ABDOMEN WITH IV CONTRAST INDICATION:   Cirrhosis or Fatty Liver  COMPARISON: None  TECHNIQUE:  CT examination of the abdomen was performed  Scanning through the abdomen was performed in arterial, venous and delayed phases according a protocol specifically designed to evaluate upper abdominal viscera  Axial, sagittal, and coronal 2D reformatted images were created from the source data and submitted for interpretation  Radiation dose length product (DLP) for this visit:  1015 49 mGy-cm   This examination, like all CT scans performed in the [facility], was performed utilizing techniques to minimize radiation dose exposure, including the use of iterative reconstruction and automated exposure control  IV Contrast:  100 mL of iohexol (OMNIPAQUE) Enteric Contrast:  Enteric contrast was not administered  FINDINGS: ABDOMEN LOWER CHEST:  No clinically significant abnormality identified in the visualized lower chest  LIVER/BILIARY TREE: Cirrhotic liver morphology  No suspicious liver lesion  Patent hepatic vasculature  No biliary ductal dilation  GALLBLADDER:  No calcified gallstones  No pericholecystic inflammatory change  SPLEEN:  Unremarkable  PANCREAS:  Unremarkable  ADRENAL GLANDS:  Unremarkable  KIDNEYS/URETERS:  Unremarkable  No hydronephrosis  VISUALIZED STOMACH AND BOWEL:  Unremarkable  ABDOMINAL CAVITY:  No ascites or free intraperitoneal air  No lymphadenopathy  VESSELS:  Atherosclerotic changes are noted  No aneurysmal dilation    Small caliber esophageal varices  Left hepatic artery is replaced to the left gastric artery  ABDOMINAL WALL:  Unremarkable  OSSEOUS STRUCTURES:  No acute fracture or destructive osseous lesion  Impression: Hepatic cirrhosis and portal hypertension evidenced by small caliber esophageal varices  No ascites  No suspicious liver lesion   Workstation performed: [id number]

## 2020-08-10 NOTE — PROGRESS NOTES
[facility] - Outpatient Follow-up Note  Fracisco Clifton 67 y o  male MRN: [id number]  Encounter: [id number]          ASSESSMENT AND PLAN:      1  Iron deficiency anemia  2  H  pylori infection  3  Peptic ulcer disease    Patient presents for follow up from the hospital:  He had 2 recent admissions related to his anemia  He was found to have a significant iron deficiency anemia with HGB of 7 2 that was microcytic  He was transfused 1 unit of PRBCs previously  Iron levels are low  HGB on discharge was 8 1  EGD 7/19 showed minimal small varices with no bleeding and a small, cratered gastric ulcer - bx were positive for h pylori and he is currently on treatment with Amox/Clarith/PPI  Continue PPI, Amoxicillin, and Clarithromycin course  Continue iron supplementation  Repeat EGD in 3 months for ulcer healing  Repeat stool h pylori in 2 months for VERN  Recheck CBC in a few weeks to ensure improvement  Will also plan for colonoscopy to investigate his iron deficiency anemia (he has never had one)  Also, plan for VCE after as well  4  Alcoholic cirrhosis of liver without ascites (HCC)    CT A/P with contrast while hospitalised showed evidence of cirrhosis (this is a new diagnosis)  Suspect etiology secondary to ETOH given his history of ETOH abuse until about a month ago +/- GUEVARA  He is Child Class A, MELD score of 8  Testing for viral hepatitis, autoimmune liver disease, and hemochromatosis was negative  Varices: Small varices noted on EGD  Ascites: None; low sodium diet recommended  HE: None  [address]  Screen: CT A/P and AFP negative  Recommend continued complete ETOH cessation  Discussed importance of follow up for imaging twice a year and monitoring of labs  Also, information given to patient regarding establishing with a PCP through [address] (patient recently moved here from [facility])      Follow up in the office in 3 months   ______________________________________________________________________    SUBJECTIVE:  Patient is a 58-year-old male who presents to the office for follow-up from his hospitalizations  Patient has had 2 recent hospitalizations in relation to his iron deficiency anemia  Patient was noted to have a significant iron deficiency anemia that was symptomatic with a hemoglobin of 7 2  He received a blood transfusion of 1 unit packed red blood cells  He also underwent an endoscopy which showed evidence of small varices which were not bleeding and a small gastric ulcer  Biopsies were positive for H pylori and he was put on treatment with amoxicillin, clarithromycin, and a PPI  Patient also has a prior history of a gastric ulcer and anemia requiring transfusion when he was in [facility] as well over a year ago  He denies ever having a colonoscopy  He denies any abdominal pain  He denies any nausea or vomiting  He denies any rectal bleeding  He does note that since he has been on the iron his stools have been darker  During his hospitalization he was also diagnosed with cirrhosis  Patient does report a substantial history of alcohol abuse for many years  He reports drinking over 7 beers a day  He states he stopped drinking a month ago  Liver workup for other etiologies such as viral hepatitis, autoimmune liver disease, and hemochromatosis were negative  This CT scan was also negative for [address]  and negative for ascites  His hemoglobin on discharge was 8 1  Patient is in need of establishing with a primary care physician here  REVIEW OF SYSTEMS IS OTHERWISE NEGATIVE        Historical Information   Past Medical History:   Diagnosis Date    Cirrhosis ([address] )     H pylori ulcer     Hypertension     PUD (peptic ulcer disease)      Past Surgical History:   Procedure Laterality Date    EGD       Social History   Social History     Substance and Sexual Activity   Alcohol Use Not Currently    Frequency: 4 or more times a week    Drinks per session: 7 to 9     Social History     Substance and Sexual Activity   Drug Use Never     Social History     Tobacco Use   Smoking Status Never Smoker   Smokeless Tobacco Never Used     History reviewed  No pertinent family history  Meds/Allergies       Current Outpatient Medications:     aluminum-magnesium hydroxide-simethicone (MYLANTA) 200-200-20 mg/5 mL suspension    amoxicillin (AMOXIL) 500 mg capsule    clarithromycin (BIAXIN) 500 mg tablet    ferrous sulfate 324 (65 Fe) mg    folic acid (FOLVITE) 1 mg tablet    metoprolol tartrate (LOPRESSOR) 25 mg tablet    naproxen (NAPROSYN) 500 mg tablet    pantoprazole (PROTONIX) 40 mg tablet    saccharomyces boulardii (FLORASTOR) 250 mg capsule    No Known Allergies        Objective     Blood pressure 160/80, pulse 88, temperature 98 2 °F (36 8 °C), height 5' 10" (1 778 m), weight 98 4 kg (217 lb)  Body mass index is 31 14 kg/m²  PHYSICAL EXAM:      General Appearance:   Alert, cooperative, no distress   HEENT:   Normocephalic, atraumatic, anicteric    Neck:  Supple, symmetrical, trachea midline   Lungs:   Clear to auscultation bilaterally; no rales, rhonchi or wheezing; respirations unlabored    Heart[de-identified]   Regular rate and rhythm; no murmur, rub, or gallop  Abdomen:   Soft, non-tender, non-distended; normal bowel sounds; no masses, no organomegaly    Genitalia:   Deferred    Rectal:   Deferred    Extremities:  No cyanosis, clubbing or edema    Pulses:  2+ and symmetric    Skin:  No jaundice, rashes, or lesions    Lymph nodes:  No palpable cervical lymphadenopathy        Lab Results:   No visits with results within 1 Day(s) from this visit     Latest known visit with results is:   Admission on 08/04/2020, Discharged on 08/06/2020   Component Date Value    WBC 08/04/2020 5 01     RBC 08/04/2020 3 80*    Hemoglobin 08/04/2020 7 3*    Hematocrit 08/04/2020 26 9*    MCV 08/04/2020 71*    MCH 08/04/2020 19 2*    MCHC 08/04/2020 27 1*    RDW 08/04/2020 17 5*    MPV 08/04/2020 9 1     Platelets 61/93/5704 137*    nRBC 08/04/2020 0     Neutrophils Relative 08/04/2020 38*    Immat GRANS % 08/04/2020 0     Lymphocytes Relative 08/04/2020 42     Monocytes Relative 08/04/2020 17*    Eosinophils Relative 08/04/2020 2     Basophils Relative 08/04/2020 1     Neutrophils Absolute 08/04/2020 1 88     Immature Grans Absolute 08/04/2020 0 02     Lymphocytes Absolute 08/04/2020 2 12     Monocytes Absolute 08/04/2020 0 83     Eosinophils Absolute 08/04/2020 0 12     Basophils Absolute 08/04/2020 0 04     Protime 08/04/2020 14 9*    INR 08/04/2020 1 15     PTT 08/04/2020 31     Sodium 08/04/2020 139     Potassium 08/04/2020 3 5     Chloride 08/04/2020 104     CO2 08/04/2020 26     ANION GAP 08/04/2020 9     BUN 08/04/2020 7     Creatinine 08/04/2020 0 88     Glucose 08/04/2020 138     Calcium 08/04/2020 8 3     AST 08/04/2020 29     ALT 08/04/2020 44     Alkaline Phosphatase 08/04/2020 113     Total Protein 08/04/2020 7 8     Albumin 08/04/2020 3 4*    Total Bilirubin 08/04/2020 0 40     eGFR 08/04/2020 83     D-Dimer, Quant 08/04/2020 0 59*    Troponin I 08/04/2020 <0 02     NT-proBNP 08/04/2020 27     Magnesium 08/04/2020 2 2     Troponin I 08/04/2020 <0 02     Ventricular Rate 08/04/2020 95     Atrial Rate 08/04/2020 95     DC Interval 08/04/2020 134     QRSD Interval 08/04/2020 106     QT Interval 08/04/2020 376     QTC Interval 08/04/2020 472     P Axis 08/04/2020 46     QRS Axis 08/04/2020 -41     T Wave Axis 08/04/2020 100     Fecal Occult Blood Diagn* 08/05/2020 Negative     Troponin I 08/05/2020 <0 02     WBC 08/05/2020 4 93     RBC 08/05/2020 3 75*    Hemoglobin 08/05/2020 7 2*    Hematocrit 08/05/2020 27 0*    MCV 08/05/2020 72*    MCH 08/05/2020 19 2*    MCHC 08/05/2020 26 7*    RDW 08/05/2020 17 5*    MPV 08/05/2020 9 9     Platelets 59/41/8868 139*    nRBC 08/05/2020 0     Neutrophils Relative 08/05/2020 39*    Immat GRANS % 08/05/2020 0     Lymphocytes Relative 08/05/2020 39     Monocytes Relative 08/05/2020 19*    Eosinophils Relative 08/05/2020 2     Basophils Relative 08/05/2020 1     Neutrophils Absolute 08/05/2020 1 90     Immature Grans Absolute 08/05/2020 0 01     Lymphocytes Absolute 08/05/2020 1 93     Monocytes Absolute 08/05/2020 0 92     Eosinophils Absolute 08/05/2020 0 12     Basophils Absolute 08/05/2020 0 05     Sodium 08/05/2020 140     Potassium 08/05/2020 3 6     Chloride 08/05/2020 105     CO2 08/05/2020 28     ANION GAP 08/05/2020 7     BUN 08/05/2020 6     Creatinine 08/05/2020 0 76     Glucose 08/05/2020 113     Glucose, Fasting 08/05/2020 113*    Calcium 08/05/2020 8 1*    eGFR 08/05/2020 89     Iron Saturation 08/05/2020 3     TIBC 08/05/2020 480*    Iron 08/05/2020 16*    Ferritin 08/05/2020 5*    ABO Grouping 08/05/2020 B     Rh Factor 08/05/2020 Positive     Antibody Screen 08/05/2020 Negative     Specimen Expiration Date 08/05/2020 89820343     Unit Product Code 08/06/2020 C6735Y20     Unit Number 08/06/2020 [id number]     Unit ABO 08/06/2020 B     Unit RH 08/06/2020 POS     Crossmatch 08/06/2020 Compatible     Unit Dispense Status 08/06/2020 Presumed Trans     ABO Grouping 08/05/2020 B     Rh Factor 08/05/2020 Positive     Ventricular Rate 08/04/2020 92     Atrial Rate 08/04/2020 92     MD Interval 08/04/2020 134     QRSD Interval 08/04/2020 94     QT Interval 08/04/2020 398     QTC Interval 08/04/2020 492     P Axis 08/04/2020 23     QRS [place] 08/04/2020 -52     T Wave Axis 08/04/2020 101     WBC 08/06/2020 5 63     RBC 08/06/2020 3 97     Hemoglobin 08/06/2020 8 1*    Hematocrit 08/06/2020 28 9*    MCV 08/06/2020 73*    MCH 08/06/2020 20 4*    MCHC 08/06/2020 28 0*    RDW 08/06/2020 18 6*    MPV 08/06/2020 9 5     Platelets 14/34/3966 135*    nRBC 08/06/2020 0     Neutrophils Relative 08/06/2020 50     Immat GRANS % 08/06/2020 0     Lymphocytes Relative 08/06/2020 33     Monocytes Relative 08/06/2020 14*    Eosinophils Relative 08/06/2020 2     Basophils Relative 08/06/2020 1     Neutrophils Absolute 08/06/2020 2 83     Immature Grans Absolute 08/06/2020 0 02     Lymphocytes Absolute 08/06/2020 1 84     Monocytes Absolute 08/06/2020 0 79     Eosinophils Absolute 08/06/2020 0 11     Basophils Absolute 08/06/2020 0 04     Sodium 08/06/2020 140     Potassium 08/06/2020 3 9     Chloride 08/06/2020 107     CO2 08/06/2020 28     ANION GAP 08/06/2020 5     BUN 08/06/2020 9     Creatinine 08/06/2020 0 84     Glucose 08/06/2020 117     Calcium 08/06/2020 8 2*    eGFR 08/06/2020 85     AFP TUMOR MARKER 08/06/2020 4 8     GINA 08/06/2020 Negative     Smooth Muscle Ab 08/06/2020 10     Mitochondrial Ab 08/06/2020 <20 0     Hepatitis B Surface Ag 08/06/2020 Non-reactive     Hep A IgM 08/06/2020 Non-reactive     Hepatitis C Ab 08/06/2020 Non-reactive     Hep B C IgM 08/06/2020 Non-reactive     Iron Saturation 08/06/2020 8     TIBC 08/06/2020 488*    Iron 08/06/2020 37*    Ventricular Rate 08/06/2020 80     Atrial Rate 08/06/2020 80     HI Interval 08/06/2020 142     QRSD Interval 08/06/2020 96     QT Interval 08/06/2020 402     QTC Interval 08/06/2020 463     P Axis 08/06/2020 41     QRS Axis 08/06/2020 -48     T Wave Axis 08/06/2020 109          Radiology Results:   Xr Chest Portable    Result Date: 8/4/2020  Narrative: CHEST INDICATION:   SOB  COMPARISON:  July 18, 2020 EXAM PERFORMED/VIEWS:  XR CHEST PORTABLE FINDINGS: Cardiomediastinal silhouette appears unremarkable  The lungs are clear  No pneumothorax or pleural effusion  Osseous structures appear within normal limits for patient age  Impression: No acute cardiopulmonary disease   Workstation performed: [id number]     Xr Chest 1 View Portable    Result Date: 7/18/2020  Narrative: CHEST INDICATION:  Chest pain  COMPARISON:  None EXAM PERFORMED/VIEWS:  XR CHEST PORTABLE FINDINGS:  Monitoring leads and clips project over the chest  Cardiomediastinal silhouette appears unremarkable  The lungs are clear  No pneumothorax or pleural effusion  Paravertebral ossifications thoracic spine  Impression: No acute cardiopulmonary disease  Workstation performed: [id number]     Ct Abdomen W Contrast    Result Date: 7/20/2020  Narrative: CT ABDOMEN WITH IV CONTRAST INDICATION:   Cirrhosis or Fatty Liver  COMPARISON: None  TECHNIQUE:  CT examination of the abdomen was performed  Scanning through the abdomen was performed in arterial, venous and delayed phases according a protocol specifically designed to evaluate upper abdominal viscera  Axial, sagittal, and coronal 2D reformatted images were created from the source data and submitted for interpretation  Radiation dose length product (DLP) for this visit:  1015 49 mGy-cm   This examination, like all CT scans performed in the [facility], was performed utilizing techniques to minimize radiation dose exposure, including the use of iterative reconstruction and automated exposure control  IV Contrast:  100 mL of iohexol (OMNIPAQUE) Enteric Contrast:  Enteric contrast was not administered  FINDINGS: ABDOMEN LOWER CHEST:  No clinically significant abnormality identified in the visualized lower chest  LIVER/BILIARY TREE: Cirrhotic liver morphology  No suspicious liver lesion  Patent hepatic vasculature  No biliary ductal dilation  GALLBLADDER:  No calcified gallstones  No pericholecystic inflammatory change  SPLEEN:  Unremarkable  PANCREAS:  Unremarkable  ADRENAL GLANDS:  Unremarkable  KIDNEYS/URETERS:  Unremarkable  No hydronephrosis  VISUALIZED STOMACH AND BOWEL:  Unremarkable  ABDOMINAL CAVITY:  No ascites or free intraperitoneal air  No lymphadenopathy  VESSELS:  Atherosclerotic changes are noted  No aneurysmal dilation    Small caliber esophageal varices  Left hepatic artery is replaced to the left gastric artery  ABDOMINAL WALL:  Unremarkable  OSSEOUS STRUCTURES:  No acute fracture or destructive osseous lesion  Impression: Hepatic cirrhosis and portal hypertension evidenced by small caliber esophageal varices  No ascites  No suspicious liver lesion   Workstation performed: [id number]

## 2020-08-11 LAB
A2 MACROGLOB SERPL-MCNC: 263 MG/DL (ref 110–276)
ALT SERPL W P-5'-P-CCNC: 36 IU/L (ref 0–55)
APO A-I SERPL-MCNC: 96 MG/DL (ref 101–178)
AST SERPL W P-5'-P-CCNC: 35 IU/L (ref 0–40)
BILIRUB SERPL-MCNC: 0.4 MG/DL (ref 0–1.2)
CHOLEST SERPL-MCNC: 142 MG/DL (ref 100–199)
FIBROSIS SCORING:: ABNORMAL
FIBROSIS STAGE SERPL QL: ABNORMAL
GGT SERPL-CCNC: 93 IU/L (ref 0–65)
GLUCOSE SERPL-MCNC: 144 MG/DL (ref 65–99)
HAPTOGLOB SERPL-MCNC: 86 MG/DL (ref 34–355)
LABORATORY COMMENT REPORT: ABNORMAL
LIVER FIBR SCORE SERPL CALC.FIBROSURE: 0.77 (ref 0–0.21)
NECROINFLAMMATORY ACT GRADE SERPL QL: ABNORMAL
NECROINFLAMMATORY ACT SCORE SERPL: 0.5
SERVICE CMNT-IMP: ABNORMAL
SL AMB INTERPRETATION: ABNORMAL
SL AMB NASH SCORING: ABNORMAL
SL AMB STEATOSIS GRADE: ABNORMAL
SL AMB STEATOSIS SCORE: 0.81 (ref 0–0.3)
STEATOSIS GRADING: ABNORMAL
TRIGL SERPL-MCNC: 126 MG/DL (ref 0–149)

## 2020-08-11 NOTE — PHYSICIAN ADVISOR
Current patient class: Inpatient  The patient is currently on Hospital Day: 3 at [address]      The patient was admitted to the hospital at [phone] on 8/5/20 for the following diagnosis:  Dyspnea [R06 00]  SOB (shortness of breath) [R06 02]       There is documentation in the medical record of an expected length of stay of at least 2 midnights  The patient is therefore expected to satisfy the 2 midnight benchmark and given the 2 midnight presumption is appropriate for INPATIENT ADMISSION  Given this expectation of a satisfying stay, CMS instructs us that the patient is most often appropriate for inpatient admission under part A provided medical necessity is documented in the chart  After review of the relevant documentation, labs, vital signs and test results, the patient is appropriate for INPATIENT ADMISSION  Admission to the hospital as an inpatient is a complex decision making process which requires the practitioner to consider the patients presenting complaint, history and physical examination and all relevant testing  With this in mind, in this case, the patient was deemed appropriate for INPATIENT ADMISSION  After review of the documentation and testing available at the time of the admission I concur with this clinical determination of medical necessity  Rationale is as follows: The patient is a 67 yrs old Male who presented to the ED at 8/4/2020  7:43 PM with a chief complaint of Shortness of Breath (starting today, denies cough, denies fevers)  Patient was found to be anemic with a hemoglobin of 7 3  The patient does have a history of erosive duodenitis and gastric ulcer  Patient is needed blood transfusions in the past   On day 2 of admission the patient's hemoglobin was 7 2 in the patient was being transfused  The patient was also going to get a gastroenterology consult was done the next day    Given the need for blood transfusion and patient came with symptomatic anemia needing a GI evaluation the patient did cross the 2 midnight benchmark as set by Medicare and is inpatient status appropriate  The patients vitals on arrival were   ED Triage Vitals   Temperature Pulse Respirations Blood Pressure SpO2   08/04/20 1950 08/04/20 1950 08/04/20 1950 08/04/20 1950 08/04/20 1950   98 2 °F (36 8 °C) 98 18 (!) 188/85 99 %      Temp Source Heart Rate Source Patient Position - Orthostatic VS BP Location FiO2 (%)   08/04/20 1950 08/04/20 1950 08/04/20 1950 08/04/20 1950 --   Oral Monitor Lying Right arm       Pain Score       08/04/20 2155       No Pain           Past Medical History:   Diagnosis Date    Cirrhosis ([address] )     H pylori ulcer     Hypertension     PUD (peptic ulcer disease)      Past Surgical History:   Procedure Laterality Date    EGD             Consults have been placed to:   IP CONSULT TO GASTROENTEROLOGY    Vitals:    08/06/20 0730 08/06/20 1119 08/06/20 1514 08/06/20 1546   BP: 120/79 123/56 125/58 133/74   Pulse: 78 79 76 83   Resp: 18 18 18 18   Temp: 98 1 °F (36 7 °C) 98 4 °F (36 9 °C) (!) 97 2 °F (36 2 °C) 97 5 °F (36 4 °C)   TempSrc:       SpO2: 97% 98% 99% 97%   Weight:       Height:           Most recent labs:    No results for input(s): WBC, HGB, HCT, PLT, K, NA, CALCIUM, BUN, CREATININE, LIPASE, AMYLASE, INR, TROPONINI, CKTOTAL, AST, ALT, ALKPHOS, BILITOT in the last 72 hours  Scheduled Meds:  Continuous Infusions:No current facility-administered medications for this encounter  PRN Meds:      Surgical procedures (if appropriate):

## 2020-08-18 ENCOUNTER — ANESTHESIA EVENT (OUTPATIENT)
Dept: GASTROENTEROLOGY | Facility: HOSPITAL | Age: 75
End: 2020-08-18

## 2020-08-18 RX ORDER — SODIUM CHLORIDE, SODIUM LACTATE, POTASSIUM CHLORIDE, CALCIUM CHLORIDE 600; 310; 30; 20 MG/100ML; MG/100ML; MG/100ML; MG/100ML
125 INJECTION, SOLUTION INTRAVENOUS CONTINUOUS
Status: CANCELLED | OUTPATIENT
Start: 2020-08-18

## 2020-08-19 ENCOUNTER — HOSPITAL ENCOUNTER (OUTPATIENT)
Dept: GASTROENTEROLOGY | Facility: HOSPITAL | Age: 75
Setting detail: OUTPATIENT SURGERY
Discharge: HOME | End: 2020-08-19
Attending: INTERNAL MEDICINE | Admitting: INTERNAL MEDICINE
Payer: MEDICARE

## 2020-08-19 ENCOUNTER — TELEPHONE (OUTPATIENT)
Dept: GASTROENTEROLOGY | Facility: CLINIC | Age: 75
End: 2020-08-19

## 2020-08-19 ENCOUNTER — ANESTHESIA (OUTPATIENT)
Dept: GASTROENTEROLOGY | Facility: HOSPITAL | Age: 75
End: 2020-08-19

## 2020-08-19 VITALS
OXYGEN SATURATION: 99 % | DIASTOLIC BLOOD PRESSURE: 73 MMHG | RESPIRATION RATE: 22 BRPM | TEMPERATURE: 97.8 F | HEART RATE: 83 BPM | HEIGHT: 70 IN | WEIGHT: 211.2 LBS | BODY MASS INDEX: 30.24 KG/M2 | SYSTOLIC BLOOD PRESSURE: 153 MMHG

## 2020-08-19 DIAGNOSIS — K70.30 ALCOHOLIC CIRRHOSIS OF LIVER WITHOUT ASCITES (HCC): ICD-10-CM

## 2020-08-19 DIAGNOSIS — D50.9 IRON DEFICIENCY ANEMIA, UNSPECIFIED IRON DEFICIENCY ANEMIA TYPE: ICD-10-CM

## 2020-08-19 DIAGNOSIS — K27.9 PEPTIC ULCER DISEASE: ICD-10-CM

## 2020-08-19 DIAGNOSIS — A04.8 H. PYLORI INFECTION: ICD-10-CM

## 2020-08-19 PROCEDURE — 88305 TISSUE EXAM BY PATHOLOGIST: CPT | Performed by: PATHOLOGY

## 2020-08-19 PROCEDURE — 45385 COLONOSCOPY W/LESION REMOVAL: CPT | Performed by: INTERNAL MEDICINE

## 2020-08-19 RX ORDER — PROPOFOL 10 MG/ML
INJECTION, EMULSION INTRAVENOUS AS NEEDED
Status: DISCONTINUED | OUTPATIENT
Start: 2020-08-19 | End: 2020-08-19

## 2020-08-19 RX ORDER — SODIUM CHLORIDE, SODIUM LACTATE, POTASSIUM CHLORIDE, CALCIUM CHLORIDE 600; 310; 30; 20 MG/100ML; MG/100ML; MG/100ML; MG/100ML
INJECTION, SOLUTION INTRAVENOUS CONTINUOUS PRN
Status: DISCONTINUED | OUTPATIENT
Start: 2020-08-19 | End: 2020-08-19

## 2020-08-19 RX ADMIN — PROPOFOL 80 MG: 10 INJECTION, EMULSION INTRAVENOUS at 12:16

## 2020-08-19 RX ADMIN — PROPOFOL 20 MG: 10 INJECTION, EMULSION INTRAVENOUS at 12:28

## 2020-08-19 RX ADMIN — PROPOFOL 20 MG: 10 INJECTION, EMULSION INTRAVENOUS at 12:18

## 2020-08-19 RX ADMIN — PROPOFOL 20 MG: 10 INJECTION, EMULSION INTRAVENOUS at 12:22

## 2020-08-19 RX ADMIN — PROPOFOL 20 MG: 10 INJECTION, EMULSION INTRAVENOUS at 12:24

## 2020-08-19 RX ADMIN — SODIUM CHLORIDE, SODIUM LACTATE, POTASSIUM CHLORIDE, AND CALCIUM CHLORIDE: .6; .31; .03; .02 INJECTION, SOLUTION INTRAVENOUS at 11:30

## 2020-08-19 RX ADMIN — PROPOFOL 20 MG: 10 INJECTION, EMULSION INTRAVENOUS at 12:26

## 2020-08-19 RX ADMIN — PROPOFOL 20 MG: 10 INJECTION, EMULSION INTRAVENOUS at 12:20

## 2020-08-19 RX ADMIN — PROPOFOL 20 MG: 10 INJECTION, EMULSION INTRAVENOUS at 12:30

## 2020-08-19 NOTE — ANESTHESIA POSTPROCEDURE EVALUATION
Post-Op Assessment Note    CV Status:  Stable    Pain management: adequate     Mental Status:  Alert and awake   Hydration Status:  Euvolemic   PONV Controlled:  Controlled   Airway Patency:  Patent      Post Op Vitals Reviewed: Yes      Staff: CRNA         No complications documented      BP   142/78   Temp   97   Pulse  76   Resp   18   SpO2   99

## 2020-08-19 NOTE — DISCHARGE INSTRUCTIONS
Colonoscopia   LO QUE NECESITA SABER:   Aldo colonoscopia es un procedimiento para examinar con un endoscopio el interior de morales colon (intestino)  Melissa aldo colonoscopia, es posible que le retiren pólipos o crecimientos de tejidos  Es normal que se sienta inflamado o que tenga molestia abdominal  Usted debería estar expulsando los gases  Si tiene hemorroides o si le removieron pólipos, usted podría presentar aldo pequeña cantidad de sangrado  INSTRUCCIONES SOBRE EL NEETU HOSPITALARIA:   Busque atención médica de inmediato si:   · Usted presenta aldo cantidad ghanshyam de carol toshia brillante en abel evacuaciones intestinales  · Morales abdomen está tracey y firme y usted siente dolor intenso  · Usted tiene dificultad repentina para respirar  Pregúntele a morales Gigi Banker vitaminas y minerales son adecuados para usted  · Usted presenta sarpullido o urticaria  · Usted tiene fiebre dentro de las 24 horas después de morales procedimiento  · Usted no ha tenido alod evacuación intestinal después de 3 días de morales procedimiento  · Usted tiene preguntas o inquietudes acerca de morales condición o cuidado  Actividad:   · No levante nada, no se esfuerce o corra  por 3 días después de morales procedimiento  · Descanse después de morales procedimiento  A usted le grewal administrado medicamento para relajarse  No  maneje o tome decisiones importantes hasta el día siguiente de morales procedimiento  Regrese a abel actividades normales según le indiquen  · Alivie los gases y la incomodidad de la inflamación  acostándose en morales costado derecho con aldo almohada térmica sobre moralse abdomen  Es posible que necesite caminar un poco para ayudar a eliminar los gases  Coma comidas pequeñas hasta que se alivie de la inflamación  Si a usted le removieron pólipos:  Por 7 días después de morales procedimiento:  · No  tome aspirina  · No  realice paseos largos en momo  Ayude a prevenir el estreñimiento:   · Consuma alimentos saludables y variados    Los alimentos saludables incluyen fruta, vegetales, panes integrales, productos lácteos bajo en grasa, frijoles, deven sin grasa, y pescado  Pregunte si necesita seguir aldo dieta especial  Morales médico puede recomendarle que coma alimentos ricos en fibra, taran frijoles cocidos  La fibra lo ayuda a tener evacuaciones intestinales regulares  · [address] se le haya indicado  Los adultos deberían de beber entre 9 a 13 vasos de 8 onzas de líquidos cada día  Pregunte cuál es la cantidad [place] para usted  Para [facility], los mejores líquidos son Doretha Hew, y Van  · Ejercítese según indicaciones  Consulte con morales médico acerca de cuál es el mejor régimen de ejercicio para usted  El ejercicio puede ayudar a prevenir estreñimiento, reducir morales presión arterial y [employer]  Acuda a abel consultas de control con morales médico según le indicaron  Anote abel preguntas para que se acuerde de hacerlas riley abel visitas  © 2017 [address] Information is for End User's use only and may not be sold, redistributed or otherwise used for commercial purposes  All illustrations and images included in CareNotes® are the copyrighted property of A D A M , Inc  or Ben Diez  Esta información es sólo para uso en educación  Morales intención no es darle un consejo médico sobre enfermedades o tratamientos  Colsulte con morales Porter Pinta farmacéutico antes de seguir cualquier régimen médico para saber si es seguro y efectivo para usted

## 2020-08-19 NOTE — ANESTHESIA PREPROCEDURE EVALUATION
Procedure:  COLONOSCOPY    Relevant Problems   CARDIO   (+) Essential hypertension      GI/HEPATIC   (+) Peptic ulcer disease      HEMATOLOGY   (+) Anemia      PULMONARY   (+) Chronic shortness of breath      LEFT VENTRICLE:  Systolic function was normal  LVEF >60%  Although no diagnostic regional wall motion abnormality was identified, this possibility cannot be completely excluded on the basis of this study  Wall thickness was mildly increased  There was mild concentric hypertrophy  Doppler parameters were consistent with abnormal left ventricular relaxation (grade 1 diastolic dysfunction)      LEFT ATRIUM:  The atrium was mildly dilated      MITRAL VALVE:  There was mild to moderate annular calcification  There was trace regurgitation      TRICUSPID VALVE:  There was mild regurgitation           Anesthesia Plan  ASA Score- 2     Anesthesia Type- IV sedation with anesthesia with ASA Monitors  Additional Monitors:   Airway Plan:           Plan Factors-    Chart reviewed  EKG reviewed  Imaging results reviewed  Existing labs reviewed  Induction- intravenous  Postoperative Plan-     Informed Consent- Anesthetic plan and risks discussed with patient  I personally reviewed this patient with the CRNA  Discussed and agreed on the Anesthesia Plan with the CRNA  Bienvenido Manzo

## 2020-08-19 NOTE — ANESTHESIA PREPROCEDURE EVALUATION
Procedure:  COLONOSCOPY    Relevant Problems   CARDIO   (+) Essential hypertension      GI/HEPATIC   (+) Peptic ulcer disease      HEMATOLOGY   (+) Anemia      PULMONARY   (+) Chronic shortness of breath        Physical Exam    Airway    Mallampati score: II  TM Distance: >3 FB  Neck ROM: full     Dental       Cardiovascular  Cardiovascular exam normal    Pulmonary  Pulmonary exam normal     Other Findings        Anesthesia Plan  ASA Score- 2     Anesthesia Type- IV sedation with anesthesia with ASA Monitors  Additional Monitors:   Airway Plan:           Plan Factors-    Induction- intravenous  Postoperative Plan-     Informed Consent- Anesthetic plan and risks discussed with patient  I personally reviewed this patient with the CRNA  Discussed and agreed on the Anesthesia Plan with the CRNA  Candido Almonte

## 2020-08-19 NOTE — INTERVAL H&P NOTE
H&P reviewed  After examining the patient I find no changes in the patients condition since the H&P had been written      Vitals:    08/19/20 1118   BP: 155/70   Pulse: 86   Resp: 20   Temp: 97 8 °F (36 6 °C)   SpO2: 98%

## 2020-11-07 DIAGNOSIS — K27.9 PEPTIC ULCER DISEASE: ICD-10-CM

## 2020-11-07 RX ORDER — PANTOPRAZOLE SODIUM 40 MG/1
40 TABLET, DELAYED RELEASE ORAL
Qty: 90 TABLET | Refills: 0 | Status: SHIPPED | OUTPATIENT
Start: 2020-11-07 | End: 2020-12-07

## 2022-05-24 ENCOUNTER — HOSPITAL ENCOUNTER (EMERGENCY)
Facility: HOSPITAL | Age: 77
Discharge: HOME | End: 2022-05-24
Attending: EMERGENCY MEDICINE | Admitting: EMERGENCY MEDICINE
Payer: COMMERCIAL

## 2022-05-24 ENCOUNTER — APPOINTMENT (EMERGENCY)
Dept: RADIOLOGY | Facility: HOSPITAL | Age: 77
End: 2022-05-24
Payer: COMMERCIAL

## 2022-05-24 VITALS
OXYGEN SATURATION: 97 % | RESPIRATION RATE: 18 BRPM | SYSTOLIC BLOOD PRESSURE: 176 MMHG | DIASTOLIC BLOOD PRESSURE: 74 MMHG | TEMPERATURE: 100 F | HEART RATE: 102 BPM

## 2022-05-24 DIAGNOSIS — U07.1 COVID-19: Primary | ICD-10-CM

## 2022-05-24 LAB
FLUAV RNA RESP QL NAA+PROBE: NEGATIVE
FLUBV RNA RESP QL NAA+PROBE: NEGATIVE
RSV RNA RESP QL NAA+PROBE: NEGATIVE
SARS-COV-2 RNA RESP QL NAA+PROBE: POSITIVE

## 2022-05-24 PROCEDURE — 99284 EMERGENCY DEPT VISIT MOD MDM: CPT

## 2022-05-24 PROCEDURE — 0241U HB NFCT DS VIR RESP RNA 4 TRGT: CPT | Performed by: EMERGENCY MEDICINE

## 2022-05-24 PROCEDURE — 99284 EMERGENCY DEPT VISIT MOD MDM: CPT | Performed by: PHYSICIAN ASSISTANT

## 2022-05-24 PROCEDURE — 71045 X-RAY EXAM CHEST 1 VIEW: CPT

## 2022-05-24 RX ORDER — ACETAMINOPHEN 325 MG/1
650 TABLET ORAL ONCE
Status: COMPLETED | OUTPATIENT
Start: 2022-05-24 | End: 2022-05-24

## 2022-05-24 RX ADMIN — ACETAMINOPHEN 650 MG: 325 TABLET, FILM COATED ORAL at 22:40

## 2022-05-25 NOTE — ED PROVIDER NOTES
History  Chief Complaint   Patient presents with    Cough     Patient reports difficulty sleeping, intermittent upper back pain and cough at home     Patient is a 50-year-old male with a past medical history significant for hypertension, cirrhosis presenting to the emergency department for evaluation of flu-like symptoms since yesterday  He states that he has been coughing  He is also reporting fevers, muscle aches, congestion, back pain  His son is sick with similar symptoms  He is not having any chest pain, difficulty breathing, abdominal pain, nausea, vomiting, diarrhea  No other complaints at this time  He states that he feels significantly improved after taking ibuprofen over-the-counter  Prior to Admission Medications   Prescriptions Last Dose Informant Patient Reported?  Taking?   aluminum-magnesium hydroxide-simethicone (MYLANTA) 200-200-20 mg/5 mL suspension  Self No No   Sig: Take 30 mL by mouth every 4 (four) hours as needed for indigestion or heartburn   ferrous sulfate 324 (65 Fe) mg  Self No No   Sig: Take 1 tablet (324 mg total) by mouth 2 (two) times a day before meals   folic acid (FOLVITE) 1 mg tablet  Self No No   Sig: Take 1 tablet (1 mg total) by mouth daily   metoprolol tartrate (LOPRESSOR) 25 mg tablet  Self No No   Sig: Take 0 5 tablets (12 5 mg total) by mouth every 12 (twelve) hours   naproxen (NAPROSYN) 500 mg tablet  Self Yes No   Sig: Take 500 mg by mouth every 12 (twelve) hours as needed   pantoprazole (PROTONIX) 40 mg tablet   No No   Sig: TAKE 1 TABLET (40 MG TOTAL) BY MOUTH DAILY IN THE EARLY MORNING   saccharomyces boulardii (FLORASTOR) 250 mg capsule  Self No No   Sig: Take 1 capsule (250 mg total) by mouth 2 (two) times a day      Facility-Administered Medications: None       Past Medical History:   Diagnosis Date    Cirrhosis ([facility] [address] )     H pylori ulcer     Hypertension     Liver disease     PUD (peptic ulcer disease)        Past Surgical History:   Procedure Laterality Date    EGD         History reviewed  No pertinent family history  I have reviewed and agree with the history as documented  E-Cigarette/Vaping    E-Cigarette Use Never User      E-Cigarette/Vaping Substances    Nicotine No     THC No     CBD No     Flavoring No     Other No     Unknown No      Social History     Tobacco Use    Smoking status: Never Smoker    Smokeless tobacco: Never Used   Vaping Use    Vaping Use: Never used   Substance Use Topics    Alcohol use: Not Currently    Drug use: Never       Review of Systems   Constitutional: Positive for fever  HENT: Positive for congestion  Respiratory: Positive for cough  Negative for shortness of breath  Cardiovascular: Negative for chest pain  Gastrointestinal: Negative for abdominal pain, diarrhea, nausea and vomiting  Musculoskeletal: Positive for back pain and myalgias  Psychiatric/Behavioral: Positive for sleep disturbance  All other systems reviewed and are negative  Physical Exam  Physical Exam  Vitals reviewed  Constitutional:       General: He is not in acute distress  Appearance: Normal appearance  He is normal weight  He is not ill-appearing, toxic-appearing or diaphoretic  HENT:      Head: Normocephalic and atraumatic  Right Ear: External ear normal       Left Ear: External ear normal       Mouth/Throat:      Mouth: Mucous membranes are moist       Pharynx: Oropharynx is clear  No oropharyngeal exudate or posterior oropharyngeal erythema  Eyes:      General: No scleral icterus  Right eye: No discharge  Left eye: No discharge  Extraocular Movements: Extraocular movements intact  Conjunctiva/sclera: Conjunctivae normal       Pupils: Pupils are equal, round, and reactive to light  Cardiovascular:      Rate and Rhythm: Normal rate and regular rhythm  Pulses: Normal pulses  Heart sounds: Normal heart sounds  No murmur heard  No friction rub  No gallop  Pulmonary:      Effort: Pulmonary effort is normal  No respiratory distress  Breath sounds: Normal breath sounds  No stridor  No wheezing, rhonchi or rales  Abdominal:      General: Abdomen is flat  Palpations: Abdomen is soft  Tenderness: There is no abdominal tenderness  There is no right CVA tenderness, left CVA tenderness, guarding or rebound  Musculoskeletal:         General: Normal range of motion  Cervical back: Normal range of motion and neck supple  Right lower leg: No edema  Left lower leg: No edema  Skin:     General: Skin is warm and dry  Capillary Refill: Capillary refill takes less than 2 seconds  Neurological:      General: No focal deficit present  Mental Status: He is alert and oriented to person, place, and time     Psychiatric:         Mood and Affect: Mood normal          Behavior: Behavior normal          Vital Signs  ED Triage Vitals [05/24/22 2058]   Temperature Pulse Respirations Blood Pressure SpO2   100 1 °F (37 8 °C) (!) 111 18 (!) 178/82 98 %      Temp Source Heart Rate Source Patient Position - Orthostatic VS BP Location FiO2 (%)   Tympanic Monitor Sitting Left arm --      Pain Score       --           Vitals:    05/24/22 2058 05/24/22 2111 05/24/22 2206   BP: (!) 178/82 (!) 190/81 (!) 176/74   Pulse: (!) 111 (!) 110 102   Patient Position - Orthostatic VS: Sitting  Lying         Visual Acuity      ED Medications  Medications   acetaminophen (TYLENOL) tablet 650 mg (has no administration in time range)       Diagnostic Studies  Results Reviewed     Procedure Component Value Units Date/Time    COVID/FLU/RSV [[id number]]  (Abnormal) Collected: 05/24/22 2059    Lab Status: Final result Specimen: Nares from Nose Updated: 05/24/22 2209     SARS-CoV-2 Positive     INFLUENZA A PCR Negative     INFLUENZA B PCR Negative     RSV PCR Negative    Narrative:      FOR PEDIATRIC PATIENTS - copy/paste COVID Guidelines URL to browser: [email or web address]    SARS-CoV-2 assay is a Nucleic Acid Amplification assay intended for the  qualitative detection of nucleic acid from SARS-CoV-2 in nasopharyngeal  swabs  Results are for the presumptive identification of SARS-CoV-2 RNA  Positive results are indicative of infection with SARS-CoV-2, the virus  causing COVID-19, but do not rule out bacterial infection or co-infection  with other viruses  Laboratories within the [place] and its  territories are required to report all positive results to the appropriate  public health authorities  Negative results do not preclude SARS-CoV-2  infection and should not be used as the sole basis for treatment or other  patient management decisions  Negative results must be combined with  clinical observations, patient history, and epidemiological information  This test has not been FDA cleared or approved  This test has been authorized by FDA under an Emergency Use Authorization  (EUA)  This test is only authorized for the duration of time the  declaration that circumstances exist justifying the authorization of the  emergency use of an in vitro diagnostic tests for detection of SARS-CoV-2  virus and/or diagnosis of COVID-19 infection under section 564(b)(1) of  the Act, 21 U  S C  778PLS-4(P)(4), unless the authorization is terminated  or revoked sooner  The test has been validated but independent review by FDA  and CLIA is pending  Test performed using [employer] GeneXpert: This RT-PCR assay targets N2,  a region unique to SARS-CoV-2  A conserved region in the E-gene was chosen  for pan-Sarbecovirus detection which includes SARS-CoV-2                   XR chest 1 view portable    (Results Pending)              Procedures  Procedures         ED Course                                             MDM  Number of Diagnoses or Management Options  COVID-19  Diagnosis management comments: Patient presenting for 1 day of flu-like symptoms  Upon arrival, he appears comfortable  He is not any acute distress  Vital signs remarkable for temperature of a 100 1° F as well as tachycardia at 111 beats per minute  No concerning findings on exam   Chest x-ray without acute cardiopulmonary disease  COVID test did come back positive  Patient's symptoms secondary to COVID infection  He was not vaccinated against COVID  He is not having any chest pain/shortness of breath  Oxygen saturation 97%  He was discharged home with instructions to follow-up with his primary care provider  Strict return precautions were discussed  He is in stable condition at time of discharge  Amount and/or Complexity of Data Reviewed  Clinical lab tests: ordered and reviewed  Tests in the radiology section of CPT®: ordered and reviewed    Patient Progress  Patient progress: stable      Disposition  Final diagnoses:   COVID-19     Time reflects when diagnosis was documented in both MDM as applicable and the Disposition within this note     Time User Action Codes Description Comment    5/24/2022 10:16 PM Phoebe Melendez, [address] [U07 1] COVID-19       ED Disposition     ED Disposition   Discharge    Condition   Stable    Date/Time   Tue May 24, 2022 10:16 PM    Comment   Hillary Layne discharge to home/self care  Follow-up Information     Follow up With Specialties Details Why Contact Info Additional [address] [facility] [address] Emergency Department Emergency Medicine Go to  If symptoms worsen [address] Emergency Department, [address] [place] [address], [facility]ly [facility]          Patient's Medications   Discharge Prescriptions    No medications on file       No discharge procedures on file      PDMP Review     None          ED Provider  Electronically Signed by           Elza Godfrey PA-C  05/24/22 3883       Elza Godfrey ASA  05/24/22 2550

## 2025-05-16 ENCOUNTER — TELEPHONE (OUTPATIENT)
Age: 80
End: 2025-05-16